# Patient Record
Sex: FEMALE | Race: WHITE | NOT HISPANIC OR LATINO | Employment: OTHER | URBAN - METROPOLITAN AREA
[De-identification: names, ages, dates, MRNs, and addresses within clinical notes are randomized per-mention and may not be internally consistent; named-entity substitution may affect disease eponyms.]

---

## 2017-02-09 ENCOUNTER — OFFICE VISIT (OUTPATIENT)
Dept: FAMILY MEDICINE CLINIC | Facility: CLINIC | Age: 82
End: 2017-02-09

## 2017-02-09 VITALS
BODY MASS INDEX: 22.36 KG/M2 | DIASTOLIC BLOOD PRESSURE: 70 MMHG | SYSTOLIC BLOOD PRESSURE: 120 MMHG | WEIGHT: 131 LBS | OXYGEN SATURATION: 98 % | HEIGHT: 64 IN | RESPIRATION RATE: 16 BRPM | HEART RATE: 66 BPM | TEMPERATURE: 96.8 F

## 2017-02-09 DIAGNOSIS — E55.9 VITAMIN D DEFICIENCY DISEASE: ICD-10-CM

## 2017-02-09 DIAGNOSIS — E78.2 MIXED HYPERLIPIDEMIA: ICD-10-CM

## 2017-02-09 DIAGNOSIS — M85.80 OSTEOPENIA: ICD-10-CM

## 2017-02-09 DIAGNOSIS — E03.9 HYPOTHYROIDISM, UNSPECIFIED TYPE: ICD-10-CM

## 2017-02-09 DIAGNOSIS — I10 ESSENTIAL HYPERTENSION: Primary | ICD-10-CM

## 2017-02-09 PROCEDURE — 99214 OFFICE O/P EST MOD 30 MIN: CPT | Performed by: FAMILY MEDICINE

## 2017-02-09 RX ORDER — ATORVASTATIN CALCIUM 10 MG/1
10 TABLET, FILM COATED ORAL DAILY
Qty: 90 TABLET | Refills: 1 | Status: SHIPPED | OUTPATIENT
Start: 2017-02-09 | End: 2017-08-09

## 2017-02-09 RX ORDER — AMLODIPINE BESYLATE 5 MG/1
5 TABLET ORAL DAILY
Qty: 90 TABLET | Refills: 1 | Status: SHIPPED | OUTPATIENT
Start: 2017-02-09 | End: 2017-08-09 | Stop reason: SDUPTHER

## 2017-02-09 RX ORDER — EZETIMIBE 10 MG/1
10 TABLET ORAL DAILY
Qty: 90 TABLET | Refills: 1 | Status: SHIPPED | OUTPATIENT
Start: 2017-02-09 | End: 2017-08-09

## 2017-02-09 RX ORDER — LEVOTHYROXINE SODIUM 0.05 MG/1
50 TABLET ORAL DAILY
Qty: 90 TABLET | Refills: 1 | Status: SHIPPED | OUTPATIENT
Start: 2017-02-09 | End: 2017-08-09 | Stop reason: SDUPTHER

## 2017-02-09 NOTE — PROGRESS NOTES
"Subjective   Blanca Weldon is a 89 y.o. female.     History of Present Illness   Chief Complaint:   Chief Complaint   Patient presents with   • Hyperlipidemia   • Hypertension   • Hypothyroidism       Blanca Weldon 89 y.o. female who presents today for Medical Management of the below listed issues and medication refills. I reviewed her lab results.   she has a history of   Patient Active Problem List   Diagnosis   • Essential hypertension   • Hyperlipidemia   • Osteopenia   • Hypothyroidism   .  Since the last visit, she has overall felt well.  she has been compliant with   Current Outpatient Prescriptions:   •  alendronate (FOSAMAX) 70 MG tablet, Take 1 tablet by mouth every 7 days., Disp: 12 tablet, Rfl: 3  •  amLODIPine (NORVASC) 5 MG tablet, Take 1 tablet by mouth daily., Disp: 90 tablet, Rfl: 1  •  atorvastatin (LIPITOR) 10 MG tablet, Take 1 tablet by mouth daily., Disp: 90 tablet, Rfl: 1  •  levothyroxine (SYNTHROID, LEVOTHROID) 50 MCG tablet, Take 1 tablet by mouth daily., Disp: 90 tablet, Rfl: 1  •  Multiple Vitamins-Minerals (CENTRUM SILVER PO), Take by mouth., Disp: , Rfl:   •  ZETIA 10 MG tablet, Take 1 tablet by mouth daily., Disp: 90 tablet, Rfl: 1.  she denies medication side effects.    All of the chronic condition(s) listed above are stable w/o issues.    Visit Vitals   • /69   • Pulse 66   • Temp 96.8 °F (36 °C) (Oral)   • Resp 16   • Ht 64\" (162.6 cm)   • Wt 131 lb (59.4 kg)   • SpO2 98%   • BMI 22.49 kg/m2       Results for orders placed or performed in visit on 10/10/16   Lipid panel   Result Value Ref Range    Total Cholesterol 146 0 - 200 mg/dL    Triglycerides 130 0 - 150 mg/dL    HDL Cholesterol 55 40 - 60 mg/dL    VLDL Cholesterol 26 5 - 40 mg/dL    LDL Cholesterol  65 0 - 100 mg/dL   Hepatic function panel   Result Value Ref Range    Total Protein 6.7 6.0 - 8.5 g/dL    Albumin 4.40 3.50 - 5.20 g/dL    Total Bilirubin 0.4 0.1 - 1.2 mg/dL    Bilirubin, Direct <0.2 0.0 - 0.3 mg/dL    " Alkaline Phosphatase 65 39 - 117 U/L    AST (SGOT) 28 1 - 32 U/L    ALT (SGPT) 25 1 - 33 U/L         The following portions of the patient's history were reviewed and updated as appropriate: allergies, current medications, past family history, past medical history, past social history, past surgical history and problem list.    Review of Systems   Constitutional: Negative for activity change, appetite change and unexpected weight change.   Eyes: Negative for visual disturbance.   Respiratory: Negative for chest tightness and shortness of breath.    Cardiovascular: Negative for chest pain and palpitations.   Skin: Negative for color change.   Neurological: Negative for syncope and speech difficulty.   Psychiatric/Behavioral: Negative for confusion and decreased concentration.       Objective   Physical Exam   Constitutional: She is oriented to person, place, and time. She appears well-developed.   HENT:   Head: Normocephalic and atraumatic.   Eyes: Pupils are equal, round, and reactive to light.   Neck: Normal range of motion.   Cardiovascular: Normal rate and regular rhythm.    Pulmonary/Chest: Effort normal and breath sounds normal.   Abdominal: Soft. Bowel sounds are normal.   Musculoskeletal: Normal range of motion.   Neurological: She is alert and oriented to person, place, and time.   Skin: Skin is warm and dry.   Psychiatric: She has a normal mood and affect. Her behavior is normal.   Nursing note and vitals reviewed.      Assessment/Plan   Blanca was seen today for hyperlipidemia, hypertension and hypothyroidism.    Diagnoses and all orders for this visit:    Essential hypertension  -     amLODIPine (NORVASC) 5 MG tablet; Take 1 tablet by mouth Daily.  -     Comprehensive metabolic panel; Future  -     Lipid panel; Future  -     CBC and Differential; Future  -     TSH; Future  -     Vitamin D 25 Hydroxy; Future    Mixed hyperlipidemia  -     atorvastatin (LIPITOR) 10 MG tablet; Take 1 tablet by mouth Daily.  -      Comprehensive metabolic panel; Future  -     Lipid panel; Future  -     CBC and Differential; Future  -     TSH; Future  -     Vitamin D 25 Hydroxy; Future    Osteopenia  -     ZETIA 10 MG tablet; Take 1 tablet by mouth Daily.  -     Comprehensive metabolic panel; Future  -     Lipid panel; Future  -     CBC and Differential; Future  -     TSH; Future  -     Vitamin D 25 Hydroxy; Future    Hypothyroidism, unspecified type  -     levothyroxine (SYNTHROID, LEVOTHROID) 50 MCG tablet; Take 1 tablet by mouth Daily.  -     Comprehensive metabolic panel; Future  -     Lipid panel; Future  -     CBC and Differential; Future  -     TSH; Future  -     Vitamin D 25 Hydroxy; Future    Vitamin D deficiency disease  -     Comprehensive metabolic panel; Future  -     Lipid panel; Future  -     CBC and Differential; Future  -     TSH; Future  -     Vitamin D 25 Hydroxy; Future

## 2017-04-04 DIAGNOSIS — M85.80 OSTEOPENIA: ICD-10-CM

## 2017-04-04 RX ORDER — EZETIMIBE 10 MG/1
TABLET ORAL
Qty: 90 TABLET | Refills: 1 | OUTPATIENT
Start: 2017-04-04

## 2017-04-11 ENCOUNTER — OFFICE VISIT (OUTPATIENT)
Dept: FAMILY MEDICINE CLINIC | Facility: CLINIC | Age: 82
End: 2017-04-11

## 2017-04-11 VITALS
TEMPERATURE: 97.2 F | OXYGEN SATURATION: 96 % | DIASTOLIC BLOOD PRESSURE: 60 MMHG | SYSTOLIC BLOOD PRESSURE: 140 MMHG | BODY MASS INDEX: 22.88 KG/M2 | RESPIRATION RATE: 16 BRPM | WEIGHT: 134 LBS | HEART RATE: 62 BPM | HEIGHT: 64 IN

## 2017-04-11 DIAGNOSIS — R11.0 NAUSEA: Primary | ICD-10-CM

## 2017-04-11 PROCEDURE — 99214 OFFICE O/P EST MOD 30 MIN: CPT | Performed by: FAMILY MEDICINE

## 2017-04-11 NOTE — PATIENT INSTRUCTIONS
Exercise 30 minutes most days of the week  Sleep 6-8 hours each night if possible  Low fat, low cholesterol diet   we discussed prescribed medications and how to take them   make sure you get results of any labs/studies ordered today  Low glycemic index diet     Stop fosomax, stop vitamins.  Take prilosec 20mg daily    Get labs done

## 2017-04-11 NOTE — PROGRESS NOTES
"Subjective   Blanca Weldon is a 89 y.o. female.     History of Present Illness   Chief Complaint:   Chief Complaint   Patient presents with   • Nausea       Blanca Weldon 89 y.o. female who presents today for nausea in the morning that has been present once a month for a year but it has worsened over the last month. She has awoke with nausea for the past three days. She denies vomiting.     she has a history of   Patient Active Problem List   Diagnosis   • Essential hypertension   • Hyperlipidemia   • Osteopenia   • Hypothyroidism   .  Since the last visit, she has overall felt well.  she has been compliant with   Current Outpatient Prescriptions:   •  alendronate (FOSAMAX) 70 MG tablet, Take 1 tablet by mouth every 7 days., Disp: 12 tablet, Rfl: 3  •  amLODIPine (NORVASC) 5 MG tablet, Take 1 tablet by mouth Daily., Disp: 90 tablet, Rfl: 1  •  atorvastatin (LIPITOR) 10 MG tablet, Take 1 tablet by mouth Daily., Disp: 90 tablet, Rfl: 1  •  levothyroxine (SYNTHROID, LEVOTHROID) 50 MCG tablet, Take 1 tablet by mouth Daily., Disp: 90 tablet, Rfl: 1  •  Multiple Vitamins-Minerals (CENTRUM SILVER PO), Take by mouth., Disp: , Rfl:   •  ZETIA 10 MG tablet, Take 1 tablet by mouth Daily., Disp: 90 tablet, Rfl: 1.  she denies medication side effects.    All of the chronic condition(s) listed above are stable w/o issues.    /60  Pulse 62  Temp 97.2 °F (36.2 °C) (Oral)   Resp 16  Ht 64\" (162.6 cm)  Wt 134 lb (60.8 kg)  SpO2 96%  BMI 23 kg/m2     The following portions of the patient's history were reviewed and updated as appropriate: allergies, current medications, past family history, past medical history, past social history, past surgical history and problem list.    Review of Systems   Constitutional: Negative for activity change, appetite change and unexpected weight change.   Eyes: Negative for visual disturbance.   Respiratory: Negative for chest tightness and shortness of breath.    Cardiovascular: Negative for " chest pain and palpitations.   Gastrointestinal: Positive for nausea.   Skin: Negative for color change.   Neurological: Negative for syncope and speech difficulty.   Psychiatric/Behavioral: Negative for confusion and decreased concentration.       Objective   Physical Exam   Constitutional: She is oriented to person, place, and time. She appears well-developed and well-nourished.   HENT:   Head: Normocephalic.   Nose: Nose normal.   Mouth/Throat: Oropharynx is clear and moist.   Eyes: Pupils are equal, round, and reactive to light.   Neck: Normal range of motion. Neck supple.   Cardiovascular: Normal rate, regular rhythm, normal heart sounds and intact distal pulses.    Pulmonary/Chest: Effort normal and breath sounds normal.   Abdominal: Soft. Bowel sounds are normal. She exhibits no distension and no mass. There is no tenderness. There is no guarding.   Musculoskeletal: Normal range of motion.   Neurological: She is alert and oriented to person, place, and time.   Skin: Skin is warm and dry.   Psychiatric: She has a normal mood and affect. Her behavior is normal.   Nursing note and vitals reviewed.      Assessment/Plan   Blanca was seen today for nausea.    Diagnoses and all orders for this visit:    Nausea

## 2017-05-09 ENCOUNTER — RESULTS ENCOUNTER (OUTPATIENT)
Dept: FAMILY MEDICINE CLINIC | Facility: CLINIC | Age: 82
End: 2017-05-09

## 2017-05-09 DIAGNOSIS — I10 ESSENTIAL HYPERTENSION: ICD-10-CM

## 2017-05-09 DIAGNOSIS — E78.2 MIXED HYPERLIPIDEMIA: ICD-10-CM

## 2017-05-09 DIAGNOSIS — E03.9 HYPOTHYROIDISM, UNSPECIFIED TYPE: ICD-10-CM

## 2017-05-09 DIAGNOSIS — E55.9 VITAMIN D DEFICIENCY DISEASE: ICD-10-CM

## 2017-05-09 DIAGNOSIS — M85.80 OSTEOPENIA: ICD-10-CM

## 2017-08-01 LAB
25(OH)D3+25(OH)D2 SERPL-MCNC: 38.4 NG/ML (ref 30–100)
ALBUMIN SERPL-MCNC: 4.1 G/DL (ref 3.5–5.2)
ALBUMIN/GLOB SERPL: 1.7 G/DL
ALP SERPL-CCNC: 74 U/L (ref 39–117)
ALT SERPL-CCNC: 19 U/L (ref 1–33)
AST SERPL-CCNC: 20 U/L (ref 1–32)
BASOPHILS # BLD AUTO: 0.04 10*3/MM3 (ref 0–0.2)
BASOPHILS NFR BLD AUTO: 0.5 % (ref 0–1.5)
BILIRUB SERPL-MCNC: 0.4 MG/DL (ref 0.1–1.2)
BUN SERPL-MCNC: 15 MG/DL (ref 8–23)
BUN/CREAT SERPL: 24.6 (ref 7–25)
CALCIUM SERPL-MCNC: 9.9 MG/DL (ref 8.6–10.5)
CHLORIDE SERPL-SCNC: 109 MMOL/L (ref 98–107)
CHOLEST SERPL-MCNC: 149 MG/DL (ref 0–200)
CO2 SERPL-SCNC: 26.3 MMOL/L (ref 22–29)
CREAT SERPL-MCNC: 0.61 MG/DL (ref 0.57–1)
EOSINOPHIL # BLD AUTO: 0.09 10*3/MM3 (ref 0–0.7)
EOSINOPHIL NFR BLD AUTO: 1.1 % (ref 0.3–6.2)
ERYTHROCYTE [DISTWIDTH] IN BLOOD BY AUTOMATED COUNT: 14.6 % (ref 11.7–13)
GLOBULIN SER CALC-MCNC: 2.4 GM/DL
GLUCOSE SERPL-MCNC: 100 MG/DL (ref 65–99)
HCT VFR BLD AUTO: 42.5 % (ref 35.6–45.5)
HDLC SERPL-MCNC: 62 MG/DL (ref 40–60)
HGB BLD-MCNC: 13.7 G/DL (ref 11.9–15.5)
IMM GRANULOCYTES # BLD: 0.03 10*3/MM3 (ref 0–0.03)
IMM GRANULOCYTES NFR BLD: 0.4 % (ref 0–0.5)
LDLC SERPL CALC-MCNC: 69 MG/DL (ref 0–100)
LYMPHOCYTES # BLD AUTO: 2.13 10*3/MM3 (ref 0.9–4.8)
LYMPHOCYTES NFR BLD AUTO: 25 % (ref 19.6–45.3)
MCH RBC QN AUTO: 33.7 PG (ref 26.9–32)
MCHC RBC AUTO-ENTMCNC: 32.2 G/DL (ref 32.4–36.3)
MCV RBC AUTO: 104.7 FL (ref 80.5–98.2)
MONOCYTES # BLD AUTO: 0.69 10*3/MM3 (ref 0.2–1.2)
MONOCYTES NFR BLD AUTO: 8.1 % (ref 5–12)
NEUTROPHILS # BLD AUTO: 5.55 10*3/MM3 (ref 1.9–8.1)
NEUTROPHILS NFR BLD AUTO: 64.9 % (ref 42.7–76)
PLATELET # BLD AUTO: 279 10*3/MM3 (ref 140–500)
POTASSIUM SERPL-SCNC: 3.9 MMOL/L (ref 3.5–5.2)
PROT SERPL-MCNC: 6.5 G/DL (ref 6–8.5)
RBC # BLD AUTO: 4.06 10*6/MM3 (ref 3.9–5.2)
SODIUM SERPL-SCNC: 148 MMOL/L (ref 136–145)
TRIGL SERPL-MCNC: 89 MG/DL (ref 0–150)
TSH SERPL DL<=0.005 MIU/L-ACNC: 1.19 MIU/ML (ref 0.27–4.2)
VLDLC SERPL CALC-MCNC: 17.8 MG/DL (ref 5–40)
WBC # BLD AUTO: 8.53 10*3/MM3 (ref 4.5–10.7)

## 2017-08-09 ENCOUNTER — OFFICE VISIT (OUTPATIENT)
Dept: FAMILY MEDICINE CLINIC | Facility: CLINIC | Age: 82
End: 2017-08-09

## 2017-08-09 VITALS
TEMPERATURE: 97.4 F | RESPIRATION RATE: 16 BRPM | DIASTOLIC BLOOD PRESSURE: 68 MMHG | HEIGHT: 64 IN | WEIGHT: 130 LBS | BODY MASS INDEX: 22.2 KG/M2 | HEART RATE: 86 BPM | SYSTOLIC BLOOD PRESSURE: 118 MMHG | OXYGEN SATURATION: 97 %

## 2017-08-09 DIAGNOSIS — I10 ESSENTIAL HYPERTENSION: Primary | ICD-10-CM

## 2017-08-09 DIAGNOSIS — E03.9 HYPOTHYROIDISM, UNSPECIFIED TYPE: ICD-10-CM

## 2017-08-09 DIAGNOSIS — M85.80 OSTEOPENIA: ICD-10-CM

## 2017-08-09 DIAGNOSIS — E78.2 MIXED HYPERLIPIDEMIA: ICD-10-CM

## 2017-08-09 PROCEDURE — 99214 OFFICE O/P EST MOD 30 MIN: CPT | Performed by: FAMILY MEDICINE

## 2017-08-09 RX ORDER — AMLODIPINE BESYLATE 5 MG/1
5 TABLET ORAL DAILY
Qty: 90 TABLET | Refills: 1 | Status: SHIPPED | OUTPATIENT
Start: 2017-08-09 | End: 2018-02-09 | Stop reason: SDUPTHER

## 2017-08-09 RX ORDER — ATORVASTATIN CALCIUM 10 MG/1
10 TABLET, FILM COATED ORAL DAILY
Qty: 90 TABLET | Refills: 1 | Status: CANCELLED | OUTPATIENT
Start: 2017-08-09

## 2017-08-09 RX ORDER — ALENDRONATE SODIUM 70 MG/1
70 TABLET ORAL
Qty: 12 TABLET | Refills: 3 | Status: CANCELLED | OUTPATIENT
Start: 2017-08-09

## 2017-08-09 RX ORDER — ASPIRIN 81 MG/1
81 TABLET ORAL DAILY
COMMUNITY
End: 2019-02-13 | Stop reason: ALTCHOICE

## 2017-08-09 RX ORDER — ATORVASTATIN CALCIUM 10 MG/1
10 TABLET, FILM COATED ORAL DAILY
COMMUNITY
End: 2017-08-09 | Stop reason: SDUPTHER

## 2017-08-09 RX ORDER — LEVOTHYROXINE SODIUM 0.05 MG/1
50 TABLET ORAL DAILY
Qty: 90 TABLET | Refills: 1 | Status: SHIPPED | OUTPATIENT
Start: 2017-08-09 | End: 2018-02-09 | Stop reason: SDUPTHER

## 2017-08-09 RX ORDER — ATORVASTATIN CALCIUM 10 MG/1
10 TABLET, FILM COATED ORAL DAILY
Qty: 90 TABLET | Refills: 1 | Status: SHIPPED | OUTPATIENT
Start: 2017-08-09 | End: 2018-02-09 | Stop reason: SDUPTHER

## 2017-08-09 RX ORDER — EZETIMIBE 10 MG/1
10 TABLET ORAL DAILY
Qty: 90 TABLET | Refills: 1 | Status: CANCELLED | OUTPATIENT
Start: 2017-08-09

## 2017-08-09 NOTE — PATIENT INSTRUCTIONS
Exercise 30 minutes most days of the week  Sleep 6-8 hours each night if possible  Low fat, low cholesterol diet   we discussed prescribed medications and how to take them   make sure you get results of any labs/studies ordered today  Low glycemic index diet     Get walker

## 2017-08-09 NOTE — PROGRESS NOTES
"Subjective   Blanca Weldon is a 89 y.o. female.     History of Present Illness   Chief Complaint:   Chief Complaint   Patient presents with   • Hyperlipidemia   • Hypertension   • Hypothyroidism   • Osteopenia       Blanca Weldon 89 y.o. female who presents today for Medical Management of the below listed issues and medication refills. I reviewed her lab results. Off fosomax   Do bone densometer 1 year  Labs good.  she has a history of   Patient Active Problem List   Diagnosis   • Essential hypertension   • Hyperlipidemia   • Osteopenia   • Hypothyroidism   .  Since the last visit, she has overall felt well.  she has been compliant with   Current Outpatient Prescriptions:   •  alendronate (FOSAMAX) 70 MG tablet, Take 1 tablet by mouth every 7 days., Disp: 12 tablet, Rfl: 3  •  amLODIPine (NORVASC) 5 MG tablet, Take 1 tablet by mouth Daily., Disp: 90 tablet, Rfl: 1  •  atorvastatin (LIPITOR) 10 MG tablet, Take 1 tablet by mouth Daily., Disp: 90 tablet, Rfl: 1  •  levothyroxine (SYNTHROID, LEVOTHROID) 50 MCG tablet, Take 1 tablet by mouth Daily., Disp: 90 tablet, Rfl: 1  •  Multiple Vitamins-Minerals (CENTRUM SILVER PO), Take by mouth., Disp: , Rfl:   •  ZETIA 10 MG tablet, Take 1 tablet by mouth Daily., Disp: 90 tablet, Rfl: 1.  she denies medication side effects.    All of the chronic condition(s) listed above are stable w/o issues.    /68  Pulse 86  Temp 97.4 °F (36.3 °C) (Oral)   Resp 16  Ht 64\" (162.6 cm)  Wt 130 lb (59 kg)  SpO2 97%  BMI 22.31 kg/m2    Results for orders placed or performed in visit on 05/09/17   Comprehensive metabolic panel   Result Value Ref Range    Glucose 100 (H) 65 - 99 mg/dL    BUN 15 8 - 23 mg/dL    Creatinine 0.61 0.57 - 1.00 mg/dL    eGFR Non African Am 92 >60 mL/min/1.73    eGFR African Am 112 >60 mL/min/1.73    BUN/Creatinine Ratio 24.6 7.0 - 25.0    Sodium 148 (H) 136 - 145 mmol/L    Potassium 3.9 3.5 - 5.2 mmol/L    Chloride 109 (H) 98 - 107 mmol/L    Total CO2 26.3 " 22.0 - 29.0 mmol/L    Calcium 9.9 8.6 - 10.5 mg/dL    Total Protein 6.5 6.0 - 8.5 g/dL    Albumin 4.10 3.50 - 5.20 g/dL    Globulin 2.4 gm/dL    A/G Ratio 1.7 g/dL    Total Bilirubin 0.4 0.1 - 1.2 mg/dL    Alkaline Phosphatase 74 39 - 117 U/L    AST (SGOT) 20 1 - 32 U/L    ALT (SGPT) 19 1 - 33 U/L   Lipid panel   Result Value Ref Range    Total Cholesterol 149 0 - 200 mg/dL    Triglycerides 89 0 - 150 mg/dL    HDL Cholesterol 62 (H) 40 - 60 mg/dL    VLDL Cholesterol 17.8 5 - 40 mg/dL    LDL Cholesterol  69 0 - 100 mg/dL   TSH   Result Value Ref Range    TSH 1.190 0.270 - 4.200 mIU/mL   Vitamin D 25 Hydroxy   Result Value Ref Range    25 Hydroxy, Vitamin D 38.4 30.0 - 100.0 ng/mL   CBC and Differential   Result Value Ref Range    WBC 8.53 4.50 - 10.70 10*3/mm3    RBC 4.06 3.90 - 5.20 10*6/mm3    Hemoglobin 13.7 11.9 - 15.5 g/dL    Hematocrit 42.5 35.6 - 45.5 %    .7 (H) 80.5 - 98.2 fL    MCH 33.7 (H) 26.9 - 32.0 pg    MCHC 32.2 (L) 32.4 - 36.3 g/dL    RDW 14.6 (H) 11.7 - 13.0 %    Platelets 279 140 - 500 10*3/mm3    Neutrophil Rel % 64.9 42.7 - 76.0 %    Lymphocyte Rel % 25.0 19.6 - 45.3 %    Monocyte Rel % 8.1 5.0 - 12.0 %    Eosinophil Rel % 1.1 0.3 - 6.2 %    Basophil Rel % 0.5 0.0 - 1.5 %    Neutrophils Absolute 5.55 1.90 - 8.10 10*3/mm3    Lymphocytes Absolute 2.13 0.90 - 4.80 10*3/mm3    Monocytes Absolute 0.69 0.20 - 1.20 10*3/mm3    Eosinophils Absolute 0.09 0.00 - 0.70 10*3/mm3    Basophils Absolute 0.04 0.00 - 0.20 10*3/mm3    Immature Granulocyte Rel % 0.4 0.0 - 0.5 %    Immature Grans Absolute 0.03 0.00 - 0.03 10*3/mm3         The following portions of the patient's history were reviewed and updated as appropriate: allergies, current medications, past family history, past medical history, past social history, past surgical history and problem list.    Review of Systems   Constitutional: Negative for activity change, appetite change and unexpected weight change.   Eyes: Negative for visual  disturbance.   Respiratory: Negative for chest tightness and shortness of breath.    Cardiovascular: Negative for chest pain and palpitations.   Skin: Negative for color change.   Neurological: Negative for syncope and speech difficulty.   Psychiatric/Behavioral: Negative for confusion and decreased concentration.       Objective   Physical Exam   Constitutional: She is oriented to person, place, and time. She appears well-developed and well-nourished.   HENT:   Head: Normocephalic and atraumatic.   Right Ear: External ear normal.   Left Ear: External ear normal.   Mouth/Throat: Oropharynx is clear and moist.   Eyes: EOM are normal. Pupils are equal, round, and reactive to light.   Neck: Normal range of motion. Neck supple. No thyromegaly present.   Cardiovascular: Normal rate and regular rhythm.    Pulmonary/Chest: Effort normal and breath sounds normal.   Abdominal: Soft. Bowel sounds are normal. There is no tenderness.   Musculoskeletal: Normal range of motion.   Lymphadenopathy:     She has no cervical adenopathy.   Neurological: She is alert and oriented to person, place, and time. She has normal reflexes.   Skin: No rash noted.   Psychiatric: She has a normal mood and affect. Her behavior is normal.   Nursing note and vitals reviewed.      Assessment/Plan   Blanca was seen today for hyperlipidemia, hypertension, hypothyroidism and osteopenia.    Diagnoses and all orders for this visit:    Essential hypertension  -     amLODIPine (NORVASC) 5 MG tablet; Take 1 tablet by mouth Daily.    Osteopenia    Mixed hyperlipidemia  -     atorvastatin (LIPITOR) 10 MG tablet; Take 1 tablet by mouth Daily.    Hypothyroidism, unspecified type  -     levothyroxine (SYNTHROID, LEVOTHROID) 50 MCG tablet; Take 1 tablet by mouth Daily.    Other orders  -     Cancel: alendronate (FOSAMAX) 70 MG tablet; Take 1 tablet by mouth Every 7 (Seven) Days.  -     Cancel: atorvastatin (LIPITOR) 10 MG tablet; Take 1 tablet by mouth Daily.  -      Cancel: ZETIA 10 MG tablet; Take 1 tablet by mouth Daily.

## 2017-09-18 ENCOUNTER — CLINICAL SUPPORT (OUTPATIENT)
Dept: FAMILY MEDICINE CLINIC | Facility: CLINIC | Age: 82
End: 2017-09-18

## 2017-09-18 DIAGNOSIS — Z23 IMMUNIZATION DUE: Primary | ICD-10-CM

## 2017-09-18 PROCEDURE — G0008 ADMIN INFLUENZA VIRUS VAC: HCPCS | Performed by: FAMILY MEDICINE

## 2017-09-18 PROCEDURE — 90686 IIV4 VACC NO PRSV 0.5 ML IM: CPT | Performed by: FAMILY MEDICINE

## 2017-12-15 ENCOUNTER — OFFICE VISIT (OUTPATIENT)
Dept: RETAIL CLINIC | Facility: CLINIC | Age: 82
End: 2017-12-15

## 2017-12-15 VITALS
OXYGEN SATURATION: 98 % | TEMPERATURE: 98.1 F | RESPIRATION RATE: 18 BRPM | HEART RATE: 77 BPM | SYSTOLIC BLOOD PRESSURE: 136 MMHG | DIASTOLIC BLOOD PRESSURE: 60 MMHG

## 2017-12-15 DIAGNOSIS — J18.9 PNEUMONIA OF RIGHT LOWER LOBE DUE TO INFECTIOUS ORGANISM: Primary | ICD-10-CM

## 2017-12-15 PROCEDURE — 99213 OFFICE O/P EST LOW 20 MIN: CPT | Performed by: NURSE PRACTITIONER

## 2017-12-15 RX ORDER — AZITHROMYCIN 250 MG/1
TABLET, FILM COATED ORAL
Qty: 6 TABLET | Refills: 0 | Status: SHIPPED | OUTPATIENT
Start: 2017-12-15 | End: 2018-02-09

## 2017-12-15 RX ORDER — CETIRIZINE HYDROCHLORIDE 10 MG/1
10 TABLET ORAL DAILY
COMMUNITY

## 2017-12-15 NOTE — PROGRESS NOTES
Subjective   Blanca Weldon is a 89 y.o. female.     Cough   This is a new problem. Episode onset: 2 days ago. The problem has been gradually worsening. The problem occurs every few minutes. The cough is productive of sputum. Associated symptoms include nasal congestion, postnasal drip and rhinorrhea. Pertinent negatives include no chest pain, chills, ear congestion, ear pain, eye redness, fever, headaches, heartburn, hemoptysis, myalgias, rash, sore throat, shortness of breath, sweats or wheezing. The symptoms are aggravated by lying down and exercise. Treatments tried: robitussin, zyrtec x 3 days. The treatment provided no relief. Her past medical history is significant for environmental allergies. There is no history of asthma, bronchitis, COPD or pneumonia.       The following portions of the patient's history were reviewed and updated as appropriate: allergies, current medications, past family history, past medical history, past social history, past surgical history and problem list.    Review of Systems   Constitutional: Positive for appetite change (diminished) and fatigue (extreme). Negative for chills, diaphoresis and fever.   HENT: Positive for congestion, postnasal drip and rhinorrhea. Negative for ear discharge, ear pain, facial swelling, hearing loss, mouth sores, nosebleeds, sinus pain, sinus pressure, sneezing, sore throat, trouble swallowing and voice change.    Eyes: Negative for pain, discharge, redness and itching.   Respiratory: Positive for cough and chest tightness. Negative for hemoptysis, shortness of breath, wheezing and stridor.    Cardiovascular: Negative for chest pain and palpitations.   Gastrointestinal: Negative for abdominal pain, constipation, diarrhea, heartburn, nausea and vomiting.   Genitourinary: Negative for decreased urine volume.   Musculoskeletal: Negative for myalgias, neck pain and neck stiffness.   Skin: Negative for rash.   Allergic/Immunologic: Positive for environmental  allergies.   Neurological: Negative for dizziness, syncope, weakness and headaches.       Objective   Physical Exam   Constitutional: She is oriented to person, place, and time. She appears well-developed and well-nourished. She is cooperative.  Non-toxic appearance. She appears ill. No distress.   HENT:   Right Ear: Hearing, tympanic membrane, external ear and ear canal normal.   Left Ear: Hearing, tympanic membrane, external ear and ear canal normal.   Nose: Nose normal. Right sinus exhibits no maxillary sinus tenderness and no frontal sinus tenderness. Left sinus exhibits no maxillary sinus tenderness and no frontal sinus tenderness.   Mouth/Throat: Uvula is midline, oropharynx is clear and moist and mucous membranes are normal. No oral lesions. No oropharyngeal exudate, posterior oropharyngeal edema or posterior oropharyngeal erythema. Tonsils are 1+ on the right. Tonsils are 1+ on the left. No tonsillar exudate.   Eyes: Conjunctivae and lids are normal.   Cardiovascular: Normal rate, regular rhythm, S1 normal and S2 normal.    Murmur heard.  Pulmonary/Chest: Effort normal. She has decreased breath sounds in the right lower field. She has no wheezes. She has no rhonchi. She has rales in the right lower field.   Abdominal: Bowel sounds are normal. There is no tenderness.   Lymphadenopathy:     She has no cervical adenopathy.   Neurological: She is alert and oriented to person, place, and time.   Skin: Skin is warm and dry. She is not diaphoretic. No pallor.   Vitals reviewed.      Assessment/Plan   Blanca was seen today for cough.    Diagnoses and all orders for this visit:    Pneumonia of right lower lobe due to infectious organism  -     azithromycin (ZITHROMAX Z-JAYLEN) 250 MG tablet; Take 2 tablets the first day, then 1 tablet daily for 4 days.          -     Continue zyrtec, avoid cough suppressants         -     Follow up with PCP or urgent treatment for persistent or worsening symptoms

## 2017-12-15 NOTE — PATIENT INSTRUCTIONS
Community-Acquired Pneumonia, Adult  Pneumonia is an infection of the lungs. There are different types of pneumonia. One type can develop while a person is in a hospital. A different type, called community-acquired pneumonia, develops in people who are not, or have not recently been, in the hospital or other health care facility.   CAUSES  Pneumonia may be caused by bacteria, viruses, or funguses. Community-acquired pneumonia is often caused by Streptococcus pneumonia bacteria. These bacteria are often passed from one person to another by breathing in droplets from the cough or sneeze of an infected person.  RISK FACTORS  The condition is more likely to develop in:  · People who have chronic diseases, such as chronic obstructive pulmonary disease (COPD), asthma, congestive heart failure, cystic fibrosis, diabetes, or kidney disease.  · People who have early-stage or late-stage HIV.  · People who have sickle cell disease.  · People who have had their spleen removed (splenectomy).  · People who have poor dental hygiene.  · People who have medical conditions that increase the risk of breathing in (aspirating) secretions their own mouth and nose.    · People who have a weakened immune system (immunocompromised).  · People who smoke.  · People who travel to areas where pneumonia-causing germs commonly exist.  · People who are around animal habitats or animals that have pneumonia-causing germs, including birds, bats, rabbits, cats, and farm animals.  SYMPTOMS  Symptoms of this condition include:  · A dry cough.  · A wet (productive) cough.  · Fever.  · Sweating.  · Chest pain, especially when breathing deeply or coughing.  · Rapid breathing or difficulty breathing.  · Shortness of breath.  · Shaking chills.  · Fatigue.  · Muscle aches.  DIAGNOSIS  Your health care provider will take a medical history and perform a physical exam. You may also have other tests, including:  · Imaging studies of your chest, including  X-rays.  · Tests to check your blood oxygen level and other blood gases.  · Other tests on blood, mucus (sputum), fluid around your lungs (pleural fluid), and urine.  If your pneumonia is severe, other tests may be done to identify the specific cause of your illness.  TREATMENT  The type of treatment that you receive depends on many factors, such as the cause of your pneumonia, the medicines you take, and other medical conditions that you have. For most adults, treatment and recovery from pneumonia may occur at home. In some cases, treatment must happen in a hospital. Treatment may include:  · Antibiotic medicines, if the pneumonia was caused by bacteria.  · Antiviral medicines, if the pneumonia was caused by a virus.  · Medicines that are given by mouth or through an IV tube.  · Oxygen.  · Respiratory therapy.  Although rare, treating severe pneumonia may include:  · Mechanical ventilation. This is done if you are not breathing well on your own and you cannot maintain a safe blood oxygen level.  · Thoracentesis. This procedure removes fluid around one lung or both lungs to help you breathe better.  HOME CARE INSTRUCTIONS  · Take over-the-counter and prescription medicines only as told by your health care provider.    Only take cough medicine if you are losing sleep. Understand that cough medicine can prevent your body's natural ability to remove mucus from your lungs.    If you were prescribed an antibiotic medicine, take it as told by your health care provider. Do not stop taking the antibiotic even if you start to feel better.  · Sleep in a semi-upright position at night. Try sleeping in a reclining chair, or place a few pillows under your head.  · Do not use tobacco products, including cigarettes, chewing tobacco, and e-cigarettes. If you need help quitting, ask your health care provider.  · Drink enough water to keep your urine clear or pale yellow. This will help to thin out mucus secretions in your  lungs.  PREVENTION  There are ways that you can decrease your risk of developing community-acquired pneumonia. Consider getting a pneumococcal vaccine if:  · You are older than 65 years of age.  · You are older than 19 years of age and are undergoing cancer treatment, have chronic lung disease, or have other medical conditions that affect your immune system. Ask your health care provider if this applies to you.  There are different types and schedules of pneumococcal vaccines. Ask your health care provider which vaccination option is best for you.  You may also prevent community-acquired pneumonia if you take these actions:  · Get an influenza vaccine every year. Ask your health care provider which type of influenza vaccine is best for you.  · Go to the dentist on a regular basis.  · Wash your hands often. Use hand  if soap and water are not available.  SEEK MEDICAL CARE IF:  · You have a fever.  · You are losing sleep because you cannot control your cough with cough medicine.  SEEK IMMEDIATE MEDICAL CARE IF:  · You have worsening shortness of breath.  · You have increased chest pain.  · Your sickness becomes worse, especially if you are an older adult or have a weakened immune system.  · You cough up blood.     This information is not intended to replace advice given to you by your health care provider. Make sure you discuss any questions you have with your health care provider.     Document Released: 12/18/2006 Document Revised: 09/07/2016 Document Reviewed: 04/13/2016  [employer] Patient Education ©2017 [employer]

## 2018-02-09 ENCOUNTER — OFFICE VISIT (OUTPATIENT)
Dept: FAMILY MEDICINE CLINIC | Facility: CLINIC | Age: 83
End: 2018-02-09

## 2018-02-09 VITALS
HEIGHT: 64 IN | DIASTOLIC BLOOD PRESSURE: 57 MMHG | WEIGHT: 125 LBS | BODY MASS INDEX: 21.34 KG/M2 | SYSTOLIC BLOOD PRESSURE: 119 MMHG | HEART RATE: 68 BPM | RESPIRATION RATE: 16 BRPM | TEMPERATURE: 97 F | OXYGEN SATURATION: 98 %

## 2018-02-09 DIAGNOSIS — M25.512 ACUTE PAIN OF LEFT SHOULDER: Primary | ICD-10-CM

## 2018-02-09 DIAGNOSIS — I10 ESSENTIAL HYPERTENSION: ICD-10-CM

## 2018-02-09 DIAGNOSIS — E03.9 HYPOTHYROIDISM, UNSPECIFIED TYPE: ICD-10-CM

## 2018-02-09 DIAGNOSIS — E78.2 MIXED HYPERLIPIDEMIA: ICD-10-CM

## 2018-02-09 PROCEDURE — 99214 OFFICE O/P EST MOD 30 MIN: CPT | Performed by: FAMILY MEDICINE

## 2018-02-09 PROCEDURE — 73030 X-RAY EXAM OF SHOULDER: CPT | Performed by: FAMILY MEDICINE

## 2018-02-09 RX ORDER — AMLODIPINE BESYLATE 5 MG/1
5 TABLET ORAL DAILY
Qty: 90 TABLET | Refills: 1 | Status: SHIPPED | OUTPATIENT
Start: 2018-02-09 | End: 2018-09-26 | Stop reason: SDUPTHER

## 2018-02-09 RX ORDER — LEVOTHYROXINE SODIUM 0.05 MG/1
50 TABLET ORAL DAILY
Qty: 90 TABLET | Refills: 1 | Status: SHIPPED | OUTPATIENT
Start: 2018-02-09 | End: 2018-09-26 | Stop reason: SDUPTHER

## 2018-02-09 RX ORDER — ATORVASTATIN CALCIUM 10 MG/1
10 TABLET, FILM COATED ORAL DAILY
Qty: 90 TABLET | Refills: 1 | Status: SHIPPED | OUTPATIENT
Start: 2018-02-09 | End: 2018-09-26 | Stop reason: SDUPTHER

## 2018-02-09 NOTE — PROGRESS NOTES
"Subjective   Blanca Weldon is a 89 y.o. female.     History of Present Illness   Chief Complaint:   Chief Complaint   Patient presents with   • Hypertension   • Hyperlipidemia   • Hypothyroidism       Blanca Weldon 89 y.o. female who presents today for Medical Management of the below listed issues and medication refills. She complains of intermittent  left shoulder pain x1 month. She had a positive fall assessment.  She fell 2 times last 6 months. Discussed falls  She is careful and does not use walker,  Gait in office normal  House well I  Lighted and all throw rugs gone  Will x ray  Left shoulder.  she has a problem list of   Patient Active Problem List   Diagnosis   • Essential hypertension   • Hyperlipidemia   • Osteopenia   • Hypothyroidism   .  Since the last visit, she has overall felt well.  she has been compliant with   Current Outpatient Prescriptions:   •  amLODIPine (NORVASC) 5 MG tablet, Take 1 tablet by mouth Daily., Disp: 90 tablet, Rfl: 1  •  aspirin 81 MG EC tablet, Take 81 mg by mouth Daily., Disp: , Rfl:   •  atorvastatin (LIPITOR) 10 MG tablet, Take 1 tablet by mouth Daily., Disp: 90 tablet, Rfl: 1  •  cetirizine (zyrTEC) 10 MG tablet, Take 10 mg by mouth Daily., Disp: , Rfl:   •  levothyroxine (SYNTHROID, LEVOTHROID) 50 MCG tablet, Take 1 tablet by mouth Daily., Disp: 90 tablet, Rfl: 1  •  Multiple Vitamins-Minerals (ICAPS PO), Take  by mouth., Disp: , Rfl: .  she denies medication side effects.    All of the chronic condition(s) listed above are stable w/o issues.    /57  Pulse 68  Temp 97 °F (36.1 °C) (Oral)   Resp 16  Ht 162.6 cm (64\")  Wt 56.7 kg (125 lb)  SpO2 98%  BMI 21.46 kg/m2    The following portions of the patient's history were reviewed and updated as appropriate: allergies, current medications, past family history, past medical history, past social history, past surgical history and problem list.    Review of Systems   Constitutional: Negative for activity change, " appetite change, chills, fatigue, fever and unexpected weight change.   HENT: Negative for congestion, ear pain, hearing loss, nosebleeds, rhinorrhea and sore throat.    Eyes: Negative for pain, redness and visual disturbance.   Respiratory: Negative for cough, shortness of breath and wheezing.    Cardiovascular: Negative for chest pain, palpitations and leg swelling.   Gastrointestinal: Negative for abdominal pain, blood in stool, constipation, diarrhea, nausea and vomiting.   Endocrine: Negative for cold intolerance and heat intolerance.   Genitourinary: Negative for difficulty urinating, dysuria, frequency, hematuria, pelvic pain, urgency and vaginal discharge.   Musculoskeletal: Negative for arthralgias, back pain and joint swelling.        Left shoulder pain x1 month   Skin: Negative for rash and wound.   Neurological: Negative for dizziness, weakness, numbness and headaches.   Hematological: Does not bruise/bleed easily.   Psychiatric/Behavioral: Negative for dysphoric mood, sleep disturbance and suicidal ideas. The patient is not nervous/anxious.        Objective   Physical Exam   Constitutional: She is oriented to person, place, and time. She appears well-developed and well-nourished.   HENT:   Mouth/Throat: Oropharynx is clear and moist.   Eyes: Pupils are equal, round, and reactive to light.   Neck: Normal range of motion.   Cardiovascular: Normal rate and regular rhythm.    Pulmonary/Chest: Effort normal and breath sounds normal.   Abdominal: Soft. Bowel sounds are normal.   Musculoskeletal:   Pain on rom left shoulder   No sts or bruise.   Neurological: She is alert and oriented to person, place, and time.   Skin: Skin is warm and dry.   Psychiatric: She has a normal mood and affect. Her behavior is normal.   Nursing note and vitals reviewed.      Assessment/Plan   Blanca was seen today for hypertension, hyperlipidemia and hypothyroidism.    Diagnoses and all orders for this visit:    Acute pain of left  shoulder  -     XR Shoulder 2+ View Left  -     Comprehensive metabolic panel; Future  -     Lipid panel; Future  -     CBC and Differential; Future  -     TSH; Future    Essential hypertension  -     amLODIPine (NORVASC) 5 MG tablet; Take 1 tablet by mouth Daily.  -     Comprehensive metabolic panel; Future  -     Lipid panel; Future  -     CBC and Differential; Future  -     TSH; Future    Mixed hyperlipidemia  -     atorvastatin (LIPITOR) 10 MG tablet; Take 1 tablet by mouth Daily.  -     Comprehensive metabolic panel; Future  -     Lipid panel; Future  -     CBC and Differential; Future  -     TSH; Future    Hypothyroidism, unspecified type  -     levothyroxine (SYNTHROID, LEVOTHROID) 50 MCG tablet; Take 1 tablet by mouth Daily.  -     Comprehensive metabolic panel; Future  -     Lipid panel; Future  -     CBC and Differential; Future  -     TSH; Future        Views: lateral and posterior-anterior    Relevant Clinical Issues/Diagnoses/Indications for XRay: see HPI  Clinical Findings: Extremities: Shoulder: Negative    Compared with previous XRay? no    Date of Previous Xray:No previous xray available for comparison    Changes on current Xray? no

## 2018-02-28 DIAGNOSIS — E78.2 MIXED HYPERLIPIDEMIA: ICD-10-CM

## 2018-02-28 RX ORDER — ATORVASTATIN CALCIUM 10 MG/1
TABLET, FILM COATED ORAL
Qty: 90 TABLET | Refills: 1 | OUTPATIENT
Start: 2018-02-28

## 2018-07-09 ENCOUNTER — RESULTS ENCOUNTER (OUTPATIENT)
Dept: FAMILY MEDICINE CLINIC | Facility: CLINIC | Age: 83
End: 2018-07-09

## 2018-07-09 DIAGNOSIS — I10 ESSENTIAL HYPERTENSION: ICD-10-CM

## 2018-07-09 DIAGNOSIS — E03.9 HYPOTHYROIDISM, UNSPECIFIED TYPE: ICD-10-CM

## 2018-07-09 DIAGNOSIS — E78.2 MIXED HYPERLIPIDEMIA: ICD-10-CM

## 2018-07-09 DIAGNOSIS — M25.512 ACUTE PAIN OF LEFT SHOULDER: ICD-10-CM

## 2018-08-02 LAB
ALBUMIN SERPL-MCNC: 4.5 G/DL (ref 3.5–5.2)
ALBUMIN/GLOB SERPL: 2 G/DL
ALP SERPL-CCNC: 73 U/L (ref 39–117)
ALT SERPL-CCNC: 12 U/L (ref 1–33)
AST SERPL-CCNC: 14 U/L (ref 1–32)
BASOPHILS # BLD AUTO: 0.04 10*3/MM3 (ref 0–0.2)
BASOPHILS NFR BLD AUTO: 0.5 % (ref 0–1.5)
BILIRUB SERPL-MCNC: 0.4 MG/DL (ref 0.1–1.2)
BUN SERPL-MCNC: 19 MG/DL (ref 8–23)
BUN/CREAT SERPL: 24.4 (ref 7–25)
CALCIUM SERPL-MCNC: 9.3 MG/DL (ref 8.2–9.6)
CHLORIDE SERPL-SCNC: 104 MMOL/L (ref 98–107)
CHOLEST SERPL-MCNC: 157 MG/DL (ref 0–200)
CO2 SERPL-SCNC: 28.8 MMOL/L (ref 22–29)
CREAT SERPL-MCNC: 0.78 MG/DL (ref 0.57–1)
EOSINOPHIL # BLD AUTO: 0.15 10*3/MM3 (ref 0–0.7)
EOSINOPHIL NFR BLD AUTO: 2 % (ref 0.3–6.2)
ERYTHROCYTE [DISTWIDTH] IN BLOOD BY AUTOMATED COUNT: 13.9 % (ref 11.7–13)
GLOBULIN SER CALC-MCNC: 2.3 GM/DL
GLUCOSE SERPL-MCNC: 91 MG/DL (ref 65–99)
HCT VFR BLD AUTO: 45.1 % (ref 35.6–45.5)
HDLC SERPL-MCNC: 57 MG/DL (ref 40–60)
HGB BLD-MCNC: 14.6 G/DL (ref 11.9–15.5)
IMM GRANULOCYTES # BLD: 0.01 10*3/MM3 (ref 0–0.03)
IMM GRANULOCYTES NFR BLD: 0.1 % (ref 0–0.5)
LDLC SERPL CALC-MCNC: 71 MG/DL (ref 0–100)
LYMPHOCYTES # BLD AUTO: 2.7 10*3/MM3 (ref 0.9–4.8)
LYMPHOCYTES NFR BLD AUTO: 35.3 % (ref 19.6–45.3)
MCH RBC QN AUTO: 33.4 PG (ref 26.9–32)
MCHC RBC AUTO-ENTMCNC: 32.4 G/DL (ref 32.4–36.3)
MCV RBC AUTO: 103.2 FL (ref 80.5–98.2)
MONOCYTES # BLD AUTO: 0.53 10*3/MM3 (ref 0.2–1.2)
MONOCYTES NFR BLD AUTO: 6.9 % (ref 5–12)
NEUTROPHILS # BLD AUTO: 4.22 10*3/MM3 (ref 1.9–8.1)
NEUTROPHILS NFR BLD AUTO: 55.3 % (ref 42.7–76)
PLATELET # BLD AUTO: 266 10*3/MM3 (ref 140–500)
POTASSIUM SERPL-SCNC: 4.2 MMOL/L (ref 3.5–5.2)
PROT SERPL-MCNC: 6.8 G/DL (ref 6–8.5)
RBC # BLD AUTO: 4.37 10*6/MM3 (ref 3.9–5.2)
SODIUM SERPL-SCNC: 145 MMOL/L (ref 136–145)
TRIGL SERPL-MCNC: 143 MG/DL (ref 0–150)
TSH SERPL DL<=0.005 MIU/L-ACNC: 0.98 MIU/ML (ref 0.27–4.2)
VLDLC SERPL CALC-MCNC: 28.6 MG/DL (ref 5–40)
WBC # BLD AUTO: 7.64 10*3/MM3 (ref 4.5–10.7)

## 2018-08-04 DIAGNOSIS — E03.9 HYPOTHYROIDISM, UNSPECIFIED TYPE: ICD-10-CM

## 2018-08-04 DIAGNOSIS — E78.2 MIXED HYPERLIPIDEMIA: ICD-10-CM

## 2018-08-04 DIAGNOSIS — I10 ESSENTIAL HYPERTENSION: ICD-10-CM

## 2018-08-06 RX ORDER — ATORVASTATIN CALCIUM 10 MG/1
10 TABLET, FILM COATED ORAL DAILY
Qty: 90 TABLET | Refills: 1 | OUTPATIENT
Start: 2018-08-06

## 2018-08-06 RX ORDER — LEVOTHYROXINE SODIUM 0.05 MG/1
50 TABLET ORAL DAILY
Qty: 90 TABLET | Refills: 1 | OUTPATIENT
Start: 2018-08-06

## 2018-08-06 RX ORDER — AMLODIPINE BESYLATE 5 MG/1
5 TABLET ORAL DAILY
Qty: 90 TABLET | Refills: 1 | OUTPATIENT
Start: 2018-08-06

## 2018-08-08 ENCOUNTER — OFFICE VISIT (OUTPATIENT)
Dept: FAMILY MEDICINE CLINIC | Facility: CLINIC | Age: 83
End: 2018-08-08

## 2018-08-08 VITALS
OXYGEN SATURATION: 98 % | BODY MASS INDEX: 21.17 KG/M2 | HEART RATE: 56 BPM | SYSTOLIC BLOOD PRESSURE: 116 MMHG | HEIGHT: 64 IN | TEMPERATURE: 97.4 F | DIASTOLIC BLOOD PRESSURE: 60 MMHG | WEIGHT: 124 LBS

## 2018-08-08 DIAGNOSIS — M85.80 OSTEOPENIA, UNSPECIFIED LOCATION: ICD-10-CM

## 2018-08-08 DIAGNOSIS — I10 ESSENTIAL HYPERTENSION: Primary | ICD-10-CM

## 2018-08-08 DIAGNOSIS — R11.0 NAUSEA: ICD-10-CM

## 2018-08-08 DIAGNOSIS — E03.9 HYPOTHYROIDISM, UNSPECIFIED TYPE: ICD-10-CM

## 2018-08-08 PROCEDURE — 99214 OFFICE O/P EST MOD 30 MIN: CPT | Performed by: FAMILY MEDICINE

## 2018-08-08 NOTE — PATIENT INSTRUCTIONS
Exercise 30 minutes most days of the week  Sleep 6-8 hours each night if possible  Low fat, low cholesterol diet   we discussed prescribed medications and how to take them   make sure you get results of any labs/studies ordered today  Low glycemic index diet     Bone densometer set up

## 2018-08-08 NOTE — PROGRESS NOTES
"Subjective   Chief Complaint:   Chief Complaint   Patient presents with   • Follow-up     6 mon f/u    • Nausea     In the morning    • Hyperlipidemia   • Hypertension         History of Present Illness             Blanca Weldon 89 y.o. female who presents today for pt is being seen today for a six month follow up and experiencing nausea in the morning .   Nausea worse and  Last 2 hrs in am.  No abd pain. Eats food in am and nausea disappears. No major weight loss. No vomiting. Labs good. Would send to gi doctor for consult.  Family wants to watch.   she has a problem list of   Patient Active Problem List   Diagnosis   • Essential hypertension   • Hyperlipidemia   • Osteopenia   • Hypothyroidism   .  Since the last visit, she has overall felt well.  she has been compliant with   Current Outpatient Prescriptions:   •  amLODIPine (NORVASC) 5 MG tablet, Take 1 tablet by mouth Daily., Disp: 90 tablet, Rfl: 1  •  aspirin 81 MG EC tablet, Take 81 mg by mouth Daily., Disp: , Rfl:   •  atorvastatin (LIPITOR) 10 MG tablet, Take 1 tablet by mouth Daily., Disp: 90 tablet, Rfl: 1  •  cetirizine (zyrTEC) 10 MG tablet, Take 10 mg by mouth Daily., Disp: , Rfl:   •  levothyroxine (SYNTHROID, LEVOTHROID) 50 MCG tablet, Take 1 tablet by mouth Daily., Disp: 90 tablet, Rfl: 1.  she denies medication side effects.    All of the chronic condition(s) listed above are stable w/o issues.    /60   Pulse 56   Temp 97.4 °F (36.3 °C)   Ht 162.6 cm (64.02\")   Wt 56.2 kg (124 lb)   SpO2 98%   BMI 21.27 kg/m²     Results for orders placed or performed in visit on 07/09/18   Comprehensive metabolic panel   Result Value Ref Range    Glucose 91 65 - 99 mg/dL    BUN 19 8 - 23 mg/dL    Creatinine 0.78 0.57 - 1.00 mg/dL    eGFR Non African Am 69 >60 mL/min/1.73    eGFR African Am 84 >60 mL/min/1.73    BUN/Creatinine Ratio 24.4 7.0 - 25.0    Sodium 145 136 - 145 mmol/L    Potassium 4.2 3.5 - 5.2 mmol/L    Chloride 104 98 - 107 mmol/L    Total " CO2 28.8 22.0 - 29.0 mmol/L    Calcium 9.3 8.2 - 9.6 mg/dL    Total Protein 6.8 6.0 - 8.5 g/dL    Albumin 4.50 3.50 - 5.20 g/dL    Globulin 2.3 gm/dL    A/G Ratio 2.0 g/dL    Total Bilirubin 0.4 0.1 - 1.2 mg/dL    Alkaline Phosphatase 73 39 - 117 U/L    AST (SGOT) 14 1 - 32 U/L    ALT (SGPT) 12 1 - 33 U/L   Lipid panel   Result Value Ref Range    Total Cholesterol 157 0 - 200 mg/dL    Triglycerides 143 0 - 150 mg/dL    HDL Cholesterol 57 40 - 60 mg/dL    VLDL Cholesterol 28.6 5 - 40 mg/dL    LDL Cholesterol  71 0 - 100 mg/dL   TSH   Result Value Ref Range    TSH 0.980 0.270 - 4.200 mIU/mL   CBC and Differential   Result Value Ref Range    WBC 7.64 4.50 - 10.70 10*3/mm3    RBC 4.37 3.90 - 5.20 10*6/mm3    Hemoglobin 14.6 11.9 - 15.5 g/dL    Hematocrit 45.1 35.6 - 45.5 %    .2 (H) 80.5 - 98.2 fL    MCH 33.4 (H) 26.9 - 32.0 pg    MCHC 32.4 32.4 - 36.3 g/dL    RDW 13.9 (H) 11.7 - 13.0 %    Platelets 266 140 - 500 10*3/mm3    Neutrophil Rel % 55.3 42.7 - 76.0 %    Lymphocyte Rel % 35.3 19.6 - 45.3 %    Monocyte Rel % 6.9 5.0 - 12.0 %    Eosinophil Rel % 2.0 0.3 - 6.2 %    Basophil Rel % 0.5 0.0 - 1.5 %    Neutrophils Absolute 4.22 1.90 - 8.10 10*3/mm3    Lymphocytes Absolute 2.70 0.90 - 4.80 10*3/mm3    Monocytes Absolute 0.53 0.20 - 1.20 10*3/mm3    Eosinophils Absolute 0.15 0.00 - 0.70 10*3/mm3    Basophils Absolute 0.04 0.00 - 0.20 10*3/mm3    Immature Granulocyte Rel % 0.1 0.0 - 0.5 %    Immature Grans Absolute 0.01 0.00 - 0.03 10*3/mm3             The following portions of the patient's history were reviewed and updated as appropriate: allergies, current medications, past family history, past medical history, past social history, past surgical history and problem list.    Review of Systems   Constitutional: Positive for unexpected weight change.   HENT: Negative for congestion and sore throat.    Eyes: Negative for visual disturbance.   Respiratory: Negative for chest tightness.    Cardiovascular: Negative  for chest pain.   Gastrointestinal: Negative for abdominal pain, diarrhea and vomiting.   Genitourinary: Negative for difficulty urinating.   Musculoskeletal: Positive for back pain.   Neurological: Negative for dizziness and headaches.   Psychiatric/Behavioral: Positive for behavioral problems.       Objective   Physical Exam   Constitutional: She is oriented to person, place, and time. She appears well-developed and well-nourished.   HENT:   Head: Normocephalic.   Nose: Nose normal.   Mouth/Throat: No oropharyngeal exudate.   Eyes: Pupils are equal, round, and reactive to light.   Neck: Normal range of motion. No thyromegaly present.   Cardiovascular: Normal rate and regular rhythm.    Pulmonary/Chest: Effort normal and breath sounds normal.   Abdominal: Soft. She exhibits no distension.   Musculoskeletal: Normal range of motion. She exhibits no edema.   Neurological: She is alert and oriented to person, place, and time.   Skin: Skin is warm and dry.   Psychiatric: She has a normal mood and affect. Her behavior is normal.   Nursing note and vitals reviewed.      Assessment/Plan   There are no diagnoses linked to this encounter.

## 2018-09-17 DIAGNOSIS — E78.2 MIXED HYPERLIPIDEMIA: ICD-10-CM

## 2018-09-18 RX ORDER — ATORVASTATIN CALCIUM 10 MG/1
10 TABLET, FILM COATED ORAL DAILY
Qty: 90 TABLET | Refills: 1 | OUTPATIENT
Start: 2018-09-18

## 2018-09-26 DIAGNOSIS — I10 ESSENTIAL HYPERTENSION: ICD-10-CM

## 2018-09-26 DIAGNOSIS — E78.2 MIXED HYPERLIPIDEMIA: ICD-10-CM

## 2018-09-26 DIAGNOSIS — E03.9 HYPOTHYROIDISM, UNSPECIFIED TYPE: ICD-10-CM

## 2018-09-26 RX ORDER — ATORVASTATIN CALCIUM 10 MG/1
10 TABLET, FILM COATED ORAL DAILY
Qty: 90 TABLET | Refills: 1 | Status: SHIPPED | OUTPATIENT
Start: 2018-09-26 | End: 2019-02-13 | Stop reason: SDUPTHER

## 2018-09-26 RX ORDER — LEVOTHYROXINE SODIUM 0.05 MG/1
50 TABLET ORAL DAILY
Qty: 90 TABLET | Refills: 1 | Status: SHIPPED | OUTPATIENT
Start: 2018-09-26 | End: 2019-02-13 | Stop reason: SDUPTHER

## 2018-09-26 RX ORDER — AMLODIPINE BESYLATE 5 MG/1
5 TABLET ORAL DAILY
Qty: 90 TABLET | Refills: 1 | Status: SHIPPED | OUTPATIENT
Start: 2018-09-26 | End: 2019-02-13 | Stop reason: SDUPTHER

## 2018-10-16 DIAGNOSIS — Z78.0 POSTMENOPAUSAL: Primary | ICD-10-CM

## 2018-11-02 DIAGNOSIS — I10 ESSENTIAL HYPERTENSION: ICD-10-CM

## 2018-11-02 DIAGNOSIS — E03.9 HYPOTHYROIDISM, UNSPECIFIED TYPE: ICD-10-CM

## 2018-11-05 RX ORDER — AMLODIPINE BESYLATE 5 MG/1
5 TABLET ORAL DAILY
Qty: 90 TABLET | Refills: 1 | OUTPATIENT
Start: 2018-11-05

## 2018-11-05 RX ORDER — LEVOTHYROXINE SODIUM 0.05 MG/1
50 TABLET ORAL DAILY
Qty: 90 TABLET | Refills: 1 | OUTPATIENT
Start: 2018-11-05

## 2018-11-14 ENCOUNTER — HOSPITAL ENCOUNTER (OUTPATIENT)
Dept: BONE DENSITY | Facility: HOSPITAL | Age: 83
Discharge: HOME | End: 2018-11-14
Admitting: FAMILY MEDICINE

## 2018-11-14 ENCOUNTER — HOSPITAL ENCOUNTER (EMERGENCY)
Facility: HOSPITAL | Age: 83
Discharge: HOME | End: 2018-11-14
Attending: EMERGENCY MEDICINE | Admitting: EMERGENCY MEDICINE

## 2018-11-14 VITALS
HEIGHT: 63 IN | RESPIRATION RATE: 16 BRPM | HEART RATE: 102 BPM | TEMPERATURE: 97.5 F | BODY MASS INDEX: 21.97 KG/M2 | OXYGEN SATURATION: 98 %

## 2018-11-14 DIAGNOSIS — R04.0 EPISTAXIS: Primary | ICD-10-CM

## 2018-11-14 LAB
ANION GAP SERPL CALCULATED.3IONS-SCNC: 12.2 MMOL/L
BASOPHILS # BLD AUTO: 0.03 10*3/MM3 (ref 0–0.2)
BASOPHILS NFR BLD AUTO: 0.3 % (ref 0–1.5)
BUN BLD-MCNC: 24 MG/DL (ref 8–23)
BUN/CREAT SERPL: 27.9 (ref 7–25)
CALCIUM SPEC-SCNC: 9.2 MG/DL (ref 8.2–9.6)
CHLORIDE SERPL-SCNC: 104 MMOL/L (ref 98–107)
CO2 SERPL-SCNC: 24.8 MMOL/L (ref 22–29)
CREAT BLD-MCNC: 0.86 MG/DL (ref 0.57–1)
DEPRECATED RDW RBC AUTO: 50.5 FL (ref 37–54)
EOSINOPHIL # BLD AUTO: 0.18 10*3/MM3 (ref 0–0.7)
EOSINOPHIL NFR BLD AUTO: 1.9 % (ref 0.3–6.2)
ERYTHROCYTE [DISTWIDTH] IN BLOOD BY AUTOMATED COUNT: 13.4 % (ref 11.7–13)
GFR SERPL CREATININE-BSD FRML MDRD: 62 ML/MIN/1.73
GLUCOSE BLD-MCNC: 115 MG/DL (ref 65–99)
HCT VFR BLD AUTO: 37.3 % (ref 35.6–45.5)
HGB BLD-MCNC: 11.9 G/DL (ref 11.9–15.5)
IMM GRANULOCYTES # BLD: 0.03 10*3/MM3 (ref 0–0.03)
IMM GRANULOCYTES NFR BLD: 0.3 % (ref 0–0.5)
INR PPP: 1.05 (ref 0.9–1.1)
LYMPHOCYTES # BLD AUTO: 2.19 10*3/MM3 (ref 0.9–4.8)
LYMPHOCYTES NFR BLD AUTO: 22.9 % (ref 19.6–45.3)
MCH RBC QN AUTO: 33.1 PG (ref 26.9–32)
MCHC RBC AUTO-ENTMCNC: 31.9 G/DL (ref 32.4–36.3)
MCV RBC AUTO: 103.6 FL (ref 80.5–98.2)
MONOCYTES # BLD AUTO: 1.07 10*3/MM3 (ref 0.2–1.2)
MONOCYTES NFR BLD AUTO: 11.2 % (ref 5–12)
NEUTROPHILS # BLD AUTO: 6.08 10*3/MM3 (ref 1.9–8.1)
NEUTROPHILS NFR BLD AUTO: 63.4 % (ref 42.7–76)
PLATELET # BLD AUTO: 269 10*3/MM3 (ref 140–500)
PMV BLD AUTO: 9.3 FL (ref 6–12)
POTASSIUM BLD-SCNC: 3.5 MMOL/L (ref 3.5–5.2)
PROTHROMBIN TIME: 13.5 SECONDS (ref 11.7–14.2)
RBC # BLD AUTO: 3.6 10*6/MM3 (ref 3.9–5.2)
SODIUM BLD-SCNC: 141 MMOL/L (ref 136–145)
WBC NRBC COR # BLD: 9.58 10*3/MM3 (ref 4.5–10.7)

## 2018-11-14 PROCEDURE — 77080 DXA BONE DENSITY AXIAL: CPT

## 2018-11-14 PROCEDURE — 80048 BASIC METABOLIC PNL TOTAL CA: CPT | Performed by: PHYSICIAN ASSISTANT

## 2018-11-14 PROCEDURE — 85610 PROTHROMBIN TIME: CPT | Performed by: PHYSICIAN ASSISTANT

## 2018-11-14 PROCEDURE — 85025 COMPLETE CBC W/AUTO DIFF WBC: CPT | Performed by: PHYSICIAN ASSISTANT

## 2018-11-14 PROCEDURE — 99283 EMERGENCY DEPT VISIT LOW MDM: CPT

## 2018-11-14 NOTE — ED PROVIDER NOTES
Pt presents to ED c/o an episode of epistaxis that began yesterday night and has resolved. Pt also c/o mild coughing with the bleeding which resolved quickly, but denies light headedness, vomiting, fever, recent cold symptoms. Pt reports a similar episode 3 days ago.    Upon exam,   pt is A&O x3,   lungs are CTAB, CV reg rate nontachycardic, radial pulses strong   oropharynx is clear, there is no septal hematoma, and there is dried blood in the L nare.    Discussed with pt plan to discharge since pt bleeding has been controlled and pt has no acute abnormalities seen in labs. Pt should use a humidifier to increase moisture. Pt should f/u with PCP and ENT. Pt understands and agrees with the plan, all questions answered.    MD ATTESTATION NOTE    The ZULAY and I have discussed this patient's history, physical exam, and treatment plan.  I have reviewed the documentation and personally had a face to face interaction with the patient. I affirm the documentation and agree with the treatment and plan.  The attached note describes my personal findings.    Documentation assistance provided by jensen Tran for Dr. Alvraes.  Information recorded by the jensen was done at my direction and has been verified and validated by me.     Belkis Tran  11/14/18 [id number]       Ilsa Alvares MD  11/15/18 [id number]

## 2018-11-14 NOTE — ED NOTES
Pt reports multiple nose bleeds x a few days. Pt with most recent nose bleed tonight. Bleeding controlled. Pt take 81mg ASA daily.       Alana Butler, RN  11/14/18 [id number]

## 2018-11-14 NOTE — ED PROVIDER NOTES
EMERGENCY DEPARTMENT ENCOUNTER    CHIEF COMPLAINT  Chief Complaint: nose bleeds  History given by: patient  History limited by: none  Room Number: 13/13  PMD: Kory Bell MD      HPI:  Pt is a 89 y.o. female who presents complaining of off and on nose bleeds for the past 3 days. Pt states that the blood comes mostly out of the left nostril. Pt's daughter states that the blood had obvious blood clots in it. Pt denies picking her nose or excessive blowing of her nose. Pt takes one 81 mg ASA a day. Pt denies dizziness and weakness.    Duration:  3 days  Onset: gradual  Timing: off and on  Location: left nostril  Radiation: n/a  Quality: nose bleed  Intensity/Severity: mild  Progression: constant  Associated Symptoms: none  Aggravating Factors: none  Alleviating Factors: none  Previous Episodes: none  Treatment before arrival: none    PAST MEDICAL HISTORY  Active Ambulatory Problems     Diagnosis Date Noted   • Essential hypertension 12/14/2015   • Hyperlipidemia 12/14/2015   • Osteopenia 12/14/2015   • Hypothyroidism 02/10/2016     Resolved Ambulatory Problems     Diagnosis Date Noted   • No Resolved Ambulatory Problems     Past Medical History:   Diagnosis Date   • H/O bone density study 08/02/2016   • H/O complete eye exam 2015   • Hyperlipidemia    • Hypertension    • Hypothyroidism    • Osteoporosis    • Palpitations        PAST SURGICAL HISTORY  Past Surgical History:   Procedure Laterality Date   • ADENOIDECTOMY     • APPENDECTOMY     • COLONOSCOPY  2011   • MAMMO BILATERAL  07/26/2013   • PAP SMEAR  UNKNOWN       FAMILY HISTORY  Family History   Problem Relation Age of Onset   • COPD Mother    • Depression Other    • Heart disease Other    • Hypertension Other    • Stroke Other    • Thyroid disease Other        SOCIAL HISTORY  Social History     Socioeconomic History   • Marital status:      Spouse name: Not on file   • Number of children: Not on file   • Years of education: Not on file   • Highest  education level: Not on file   Social Needs   • Financial resource strain: Not on file   • Food insecurity - worry: Not on file   • Food insecurity - inability: Not on file   • Transportation needs - medical: Not on file   • Transportation needs - non-medical: Not on file   Occupational History   • Not on file   Tobacco Use   • Smoking status: Never Smoker   • Smokeless tobacco: Never Used   Substance and Sexual Activity   • Alcohol use: No   • Drug use: No   • Sexual activity: Defer   Other Topics Concern   • Not on file   Social History Narrative   • Not on file       ALLERGIES  Patient has no known allergies.    REVIEW OF SYSTEMS  Review of Systems   Constitutional: Negative for fever.   HENT: Positive for nosebleeds (off and on). Negative for sore throat.    Eyes: Negative.    Respiratory: Negative for cough and shortness of breath.    Cardiovascular: Negative for chest pain.   Gastrointestinal: Negative for abdominal pain, diarrhea and vomiting.   Genitourinary: Negative for dysuria.   Musculoskeletal: Negative for neck pain.   Skin: Negative for rash.   Allergic/Immunologic: Negative.    Neurological: Negative for weakness, numbness and headaches.   Hematological: Negative.    Psychiatric/Behavioral: Negative.    All other systems reviewed and are negative.      PHYSICAL EXAM  ED Triage Vitals [11/14/18 0312]   Temp Heart Rate Resp BP SpO2   97.5 °F (36.4 °C) 102 16 -- 98 %      Temp src Heart Rate Source Patient Position BP Location FiO2 (%)   Tympanic Monitor -- -- --       Physical Exam   Constitutional: She is oriented to person, place, and time. No distress.   HENT:   Head: Normocephalic and atraumatic.   Mouth/Throat: Oropharynx is clear and moist.   R nares is nml  L nares has minimal dried blood with no active bleeding   Eyes: EOM are normal. Pupils are equal, round, and reactive to light.   Neck: Normal range of motion. Neck supple.   Cardiovascular: Normal rate, regular rhythm and normal heart  sounds.   Pulmonary/Chest: Effort normal and breath sounds normal. No respiratory distress.   Abdominal: Soft. There is no tenderness. There is no rebound and no guarding.   Musculoskeletal: Normal range of motion. She exhibits no edema.   Neurological: She is alert and oriented to person, place, and time. She has normal sensation and normal strength.   Skin: Skin is warm and dry. No ecchymosis, no petechiae and no rash noted.   Psychiatric: Mood and affect normal.   Nursing note and vitals reviewed.      LAB RESULTS  Lab Results (last 24 hours)     Procedure Component Value Units Date/Time    CBC & Differential [[id number]] Collected:  11/14/18 0331    Specimen:  Blood Updated:  11/14/18 0342    Narrative:       The following orders were created for panel order CBC & Differential.  Procedure                               Abnormality         Status                     ---------                               -----------         ------                     CBC Auto Differential[[id number]]        Abnormal            Final result                 Please view results for these tests on the individual orders.    Basic Metabolic Panel [[id number]]  (Abnormal) Collected:  11/14/18 0331    Specimen:  Blood Updated:  11/14/18 0402     Glucose 115 mg/dL      BUN 24 mg/dL      Creatinine 0.86 mg/dL      Sodium 141 mmol/L      Potassium 3.5 mmol/L      Chloride 104 mmol/L      CO2 24.8 mmol/L      Calcium 9.2 mg/dL      eGFR Non African Amer 62 mL/min/1.73      BUN/Creatinine Ratio 27.9     Anion Gap 12.2 mmol/L     Narrative:       The MDRD GFR formula is only valid for adults with stable renal function between ages 18 and 70.    Protime-INR [[id number]]  (Normal) Collected:  11/14/18 0331    Specimen:  Blood Updated:  11/14/18 0352     Protime 13.5 Seconds      INR 1.05    CBC Auto Differential [[id number]]  (Abnormal) Collected:  11/14/18 0331    Specimen:  Blood Updated:  11/14/18 0342     WBC 9.58 10*3/mm3      RBC 3.60 10*6/mm3       Hemoglobin 11.9 g/dL      Hematocrit 37.3 %      .6 fL      MCH 33.1 pg      MCHC 31.9 g/dL      RDW 13.4 %      RDW-SD 50.5 fl      MPV 9.3 fL      Platelets 269 10*3/mm3      Neutrophil % 63.4 %      Lymphocyte % 22.9 %      Monocyte % 11.2 %      Eosinophil % 1.9 %      Basophil % 0.3 %      Immature Grans % 0.3 %      Neutrophils, Absolute 6.08 10*3/mm3      Lymphocytes, Absolute 2.19 10*3/mm3      Monocytes, Absolute 1.07 10*3/mm3      Eosinophils, Absolute 0.18 10*3/mm3      Basophils, Absolute 0.03 10*3/mm3      Immature Grans, Absolute 0.03 10*3/mm3           I ordered the above labs and reviewed the results    PROCEDURES  Procedures      PROGRESS AND CONSULTS     0322  Discussed plan to order labs for further evaluation of pt condition. Pt understands and agrees with the plan, all questions answered.    0333  Discussed pt case with Dr. Alvares who agrees to review pt case and examine pt.     0324  Ordered labs for further evaluation of pt condition.     0419  Discussed pt with Dr. Alvares who has now personally examined the pt. She agrees with the plan to d/c the pt home. Dr. Alvares discussed this with the pt. Pt understands and agrees with the plan, all questions answered.    MEDICAL DECISION MAKING  Results were reviewed/discussed with the patient and they were also made aware of online access. Pt also made aware that some labs, such as cultures, will not be resulted during ER visit and follow up with PMD is necessary.     MDM  Number of Diagnoses or Management Options     Amount and/or Complexity of Data Reviewed  Clinical lab tests: ordered and reviewed (Protime - 13.5)  Discussion of test results with the performing providers: yes (Dr. Alvares (Emergency Medicine))  Decide to obtain previous medical records or to obtain history from someone other than the patient: yes    Patient Progress  Patient progress: stable         DIAGNOSIS  Final diagnoses:   Epistaxis       DISPOSITION  DISCHARGE    Patient  discharged in stable condition.    Reviewed implications of results, diagnosis, meds, responsibility to follow up, warning signs and symptoms of possible worsening, potential complications and reasons to return to ER.    Patient/Family voiced understanding of above instructions.    Discussed plan for discharge, as there is no emergent indication for admission. Patient referred to primary care provider for BP management due to today's BP. Pt/family is agreeable and understands need for follow up and repeat testing.  Pt is aware that discharge does not mean that nothing is wrong but it indicates no emergency is present that requires admission and they must continue care with follow-up as given below or physician of their choice.     FOLLOW-UP  Kory Bell MD  [address] [facility] [address]  [facility] [id number] [phone]    Schedule an appointment as soon as possible for a visit       Darek Land MD  [address] [facility] [address]  [place] [facility] [address]  [phone]    Schedule an appointment as soon as possible for a visit            Medication List      No changes were made to your prescriptions during this visit.           Latest Documented Vital Signs:  As of 5:08 AM  BP-   HR- 102 Temp- 97.5 °F (36.4 °C) (Tympanic) O2 sat- 98%    --  Documentation assistance provided by jensen Kauffman for SARIKA Bach.  Information recorded by the scribe was done at my direction and has been verified and validated by me.     Kory Kauffman  11/14/18 0421       Max Daigle PA  11/14/18 6123

## 2018-11-14 NOTE — DISCHARGE INSTRUCTIONS
Home, rest, home medicine as prescribed, keep nose moisturized with saline drops, use vaseline or antibiotic ointment, use a humidifier, follow up with PCP or ENT for recheck. Return to care with further concerns.

## 2018-12-07 ENCOUNTER — HOSPITAL ENCOUNTER (EMERGENCY)
Facility: HOSPITAL | Age: 83
Discharge: HOME | End: 2018-12-07
Attending: EMERGENCY MEDICINE | Admitting: EMERGENCY MEDICINE

## 2018-12-07 VITALS
OXYGEN SATURATION: 100 % | BODY MASS INDEX: 22.15 KG/M2 | SYSTOLIC BLOOD PRESSURE: 177 MMHG | TEMPERATURE: 98.1 F | HEIGHT: 63 IN | RESPIRATION RATE: 18 BRPM | WEIGHT: 125 LBS | HEART RATE: 77 BPM | DIASTOLIC BLOOD PRESSURE: 75 MMHG

## 2018-12-07 DIAGNOSIS — R04.0 LEFT-SIDED EPISTAXIS: Primary | ICD-10-CM

## 2018-12-07 LAB
ALBUMIN SERPL-MCNC: 4.1 G/DL (ref 3.5–5.2)
ALBUMIN/GLOB SERPL: 1.6 G/DL
ALP SERPL-CCNC: 71 U/L (ref 39–117)
ALT SERPL W P-5'-P-CCNC: 12 U/L (ref 1–33)
ANION GAP SERPL CALCULATED.3IONS-SCNC: 9.2 MMOL/L
AST SERPL-CCNC: 16 U/L (ref 1–32)
BASOPHILS # BLD AUTO: 0.04 10*3/MM3 (ref 0–0.2)
BASOPHILS NFR BLD AUTO: 0.5 % (ref 0–1.5)
BILIRUB SERPL-MCNC: 0.3 MG/DL (ref 0.1–1.2)
BUN BLD-MCNC: 16 MG/DL (ref 8–23)
BUN/CREAT SERPL: 21.3 (ref 7–25)
CALCIUM SPEC-SCNC: 9.4 MG/DL (ref 8.2–9.6)
CHLORIDE SERPL-SCNC: 106 MMOL/L (ref 98–107)
CO2 SERPL-SCNC: 28.8 MMOL/L (ref 22–29)
CREAT BLD-MCNC: 0.75 MG/DL (ref 0.57–1)
DEPRECATED RDW RBC AUTO: 51.6 FL (ref 37–54)
EOSINOPHIL # BLD AUTO: 0.12 10*3/MM3 (ref 0–0.7)
EOSINOPHIL NFR BLD AUTO: 1.5 % (ref 0.3–6.2)
ERYTHROCYTE [DISTWIDTH] IN BLOOD BY AUTOMATED COUNT: 14 % (ref 11.7–13)
GFR SERPL CREATININE-BSD FRML MDRD: 73 ML/MIN/1.73
GLOBULIN UR ELPH-MCNC: 2.6 GM/DL
GLUCOSE BLD-MCNC: 100 MG/DL (ref 65–99)
HCT VFR BLD AUTO: 38 % (ref 35.6–45.5)
HGB BLD-MCNC: 12.5 G/DL (ref 11.9–15.5)
IMM GRANULOCYTES # BLD: 0.01 10*3/MM3 (ref 0–0.03)
IMM GRANULOCYTES NFR BLD: 0.1 % (ref 0–0.5)
LYMPHOCYTES # BLD AUTO: 2.68 10*3/MM3 (ref 0.9–4.8)
LYMPHOCYTES NFR BLD AUTO: 34.5 % (ref 19.6–45.3)
MCH RBC QN AUTO: 33.2 PG (ref 26.9–32)
MCHC RBC AUTO-ENTMCNC: 32.9 G/DL (ref 32.4–36.3)
MCV RBC AUTO: 101.1 FL (ref 80.5–98.2)
MONOCYTES # BLD AUTO: 0.56 10*3/MM3 (ref 0.2–1.2)
MONOCYTES NFR BLD AUTO: 7.2 % (ref 5–12)
NEUTROPHILS # BLD AUTO: 4.37 10*3/MM3 (ref 1.9–8.1)
NEUTROPHILS NFR BLD AUTO: 56.3 % (ref 42.7–76)
PLATELET # BLD AUTO: 269 10*3/MM3 (ref 140–500)
PMV BLD AUTO: 9.9 FL (ref 6–12)
POTASSIUM BLD-SCNC: 3.6 MMOL/L (ref 3.5–5.2)
PROT SERPL-MCNC: 6.7 G/DL (ref 6–8.5)
RBC # BLD AUTO: 3.76 10*6/MM3 (ref 3.9–5.2)
SODIUM BLD-SCNC: 144 MMOL/L (ref 136–145)
WBC NRBC COR # BLD: 7.77 10*3/MM3 (ref 4.5–10.7)

## 2018-12-07 PROCEDURE — 85025 COMPLETE CBC W/AUTO DIFF WBC: CPT | Performed by: NURSE PRACTITIONER

## 2018-12-07 PROCEDURE — 99283 EMERGENCY DEPT VISIT LOW MDM: CPT

## 2018-12-07 PROCEDURE — 80053 COMPREHEN METABOLIC PANEL: CPT | Performed by: NURSE PRACTITIONER

## 2018-12-07 RX ADMIN — Medication 2 SPRAY: at 17:49

## 2018-12-07 NOTE — ED PROVIDER NOTES
Pt is a 89 y.o. female who presents to the ED complaining of intermittent left epistaxis, with last episode since 1330 today. Pt was seen by Dr. Land, ENT several weeks ago. Pt states she had no active bleeding at that time but reports several intermittent episodes since then. Pt states she uses a humidifier at home.    On exam,  HENT: Dry blood in left nare    Plan: Empirically treat epistaxis, check labs, and discharge pt to f/u with ENT    1750  Applied va-synephrine spray to nares bilaterally.  Packed by Jade Rubi.    MD ATTESTATION NOTE    The ZULAY and I have discussed this patient's history, physical exam, and treatment plan.  I have reviewed the documentation and personally had a face to face interaction with the patient. I affirm the documentation and agree with the treatment and plan.  The attached note describes my personal findings.      Documentation assistance provided by jensen Jameson for Dr. Corral. Information recorded by the scribe was done at my direction and has been verified and validated by me.         Kory Jameson  12/07/18 2008       Omid Corral MD  12/07/18 2056

## 2018-12-07 NOTE — ED PROVIDER NOTES
"EMERGENCY DEPARTMENT ENCOUNTER    Room Number:  07/07  Date seen:  12/7/2018  Time seen: 5:24 PM  PCP: Kory Bell MD   ENT: Dr. Land  Historian: Patient, Family      HPI:  Chief Complaint: Epistaxis  Context: Blanca Weldon is a 89 y.o. female who presents to the ED c/o intermittent episodes of epistaxis onset about 3 weeks ago. Pt reports that she was seen for this initially in the ER and at her ENT's office several weeks ago (Per family, \"Of course during both episodes, [pt] didn't have any active bleeding when she was seen in the ER and ENT's office\"). Pt states that her most recent episode of epistaxis began at about 13:30 today. The bleeding is predominantly from the left nare. Pt has applied pressure to the nares with some sx relief. Pt denies bloody stools, chest pain, dyspnea, dizziness/lightheadedness, generalized weakness, excessive gum bleeding, cough, and congestion. Pt states that she typically takes ASA 81 mg (last took it about 5 days ago), but is not taking any other blood thinners currently. There are no other complaints at this time.     Location: Left nare  Radiation: None  Character: \"nosebleed\"  Duration: Onset about 3 weeks ago (most recent episode began at about 13:30 today)  Severity: Moderate  Progression: Waxing and waning  Aggravating Factors: Nothing  Alleviating Factors: Applying pressure to the nares        MEDICAL RECORD REVIEW    Pt was seen in the ER on 11/14/18 secondary to epistaxis. Pt's Hgb was 11.9. Pt had no active epistaxis in the ER.           ALLERGIES  Patient has no known allergies.    PAST MEDICAL HISTORY  Active Ambulatory Problems     Diagnosis Date Noted   • Essential hypertension 12/14/2015   • Hyperlipidemia 12/14/2015   • Osteopenia 12/14/2015   • Hypothyroidism 02/10/2016     Resolved Ambulatory Problems     Diagnosis Date Noted   • No Resolved Ambulatory Problems     Past Medical History:   Diagnosis Date   • H/O bone density study 08/02/2016   • H/O complete " eye exam 2015   • Hyperlipidemia    • Hypertension    • Hypothyroidism    • Osteoporosis    • Palpitations        PAST SURGICAL HISTORY  Past Surgical History:   Procedure Laterality Date   • ADENOIDECTOMY     • APPENDECTOMY     • COLONOSCOPY  2011   • MAMMO BILATERAL  07/26/2013   • PAP SMEAR  UNKNOWN       FAMILY HISTORY  Family History   Problem Relation Age of Onset   • COPD Mother    • Depression Other    • Heart disease Other    • Hypertension Other    • Stroke Other    • Thyroid disease Other        SOCIAL HISTORY  Social History     Socioeconomic History   • Marital status:      Spouse name: Not on file   • Number of children: Not on file   • Years of education: Not on file   • Highest education level: Not on file   Social Needs   • Financial resource strain: Not on file   • Food insecurity - worry: Not on file   • Food insecurity - inability: Not on file   • Transportation needs - medical: Not on file   • Transportation needs - non-medical: Not on file   Occupational History   • Not on file   Tobacco Use   • Smoking status: Never Smoker   • Smokeless tobacco: Never Used   Substance and Sexual Activity   • Alcohol use: No   • Drug use: No   • Sexual activity: Defer   Other Topics Concern   • Not on file   Social History Narrative   • Not on file           REVIEW OF SYSTEMS  Review of Systems   Constitutional: Negative for chills.   HENT: Positive for nosebleeds. Negative for congestion, rhinorrhea and sore throat.    Eyes: Negative for pain.   Respiratory: Negative for cough and shortness of breath.    Cardiovascular: Negative for chest pain, palpitations and leg swelling.   Gastrointestinal: Negative for abdominal pain, blood in stool, diarrhea, nausea and vomiting.   Genitourinary: Negative for difficulty urinating, dysuria, flank pain and frequency.   Musculoskeletal: Negative for myalgias, neck pain and neck stiffness.   Skin: Negative for rash.   Neurological: Negative for dizziness, speech  difficulty, weakness, light-headedness, numbness and headaches.   Psychiatric/Behavioral: Negative.    All other systems reviewed and are negative.          PHYSICAL EXAM  ED Triage Vitals   Temp Heart Rate Resp BP SpO2   12/07/18 1656 12/07/18 1656 12/07/18 1656 12/07/18 1742 12/07/18 1656   98.1 °F (36.7 °C) 103 18 180/74 100 %      Temp src Heart Rate Source Patient Position BP Location FiO2 (%)   -- -- -- -- --              Physical Exam   Constitutional: She is oriented to person, place, and time. No distress.   HENT:   There is a nasal clamp in place - There is dried blood in the anterior left nare upon removal of the nasal clamp. Within several minutes after the nasal clamp was removed, pt developed active epistaxis from the left nare.    Eyes: EOM are normal. Pupils are equal, round, and reactive to light.   Neck: Normal range of motion. Neck supple.   Cardiovascular: Normal rate and intact distal pulses.   Pulmonary/Chest: Effort normal. She exhibits no tenderness.   Abdominal: Soft. There is no tenderness.   Neurological: She is alert and oriented to person, place, and time. She has normal sensation and normal strength.   Skin: Skin is warm.   Psychiatric: Mood and affect normal.   Nursing note and vitals reviewed.          LAB RESULTS  Recent Results (from the past 24 hour(s))   Comprehensive Metabolic Panel    Collection Time: 12/07/18  5:52 PM   Result Value Ref Range    Glucose 100 (H) 65 - 99 mg/dL    BUN 16 8 - 23 mg/dL    Creatinine 0.75 0.57 - 1.00 mg/dL    Sodium 144 136 - 145 mmol/L    Potassium 3.6 3.5 - 5.2 mmol/L    Chloride 106 98 - 107 mmol/L    CO2 28.8 22.0 - 29.0 mmol/L    Calcium 9.4 8.2 - 9.6 mg/dL    Total Protein 6.7 6.0 - 8.5 g/dL    Albumin 4.10 3.50 - 5.20 g/dL    ALT (SGPT) 12 1 - 33 U/L    AST (SGOT) 16 1 - 32 U/L    Alkaline Phosphatase 71 39 - 117 U/L    Total Bilirubin 0.3 0.1 - 1.2 mg/dL    eGFR Non African Amer 73 >60 mL/min/1.73    Globulin 2.6 gm/dL    A/G Ratio 1.6 g/dL     BUN/Creatinine Ratio 21.3 7.0 - 25.0    Anion Gap 9.2 mmol/L   CBC Auto Differential    Collection Time: 12/07/18  5:52 PM   Result Value Ref Range    WBC 7.77 4.50 - 10.70 10*3/mm3    RBC 3.76 (L) 3.90 - 5.20 10*6/mm3    Hemoglobin 12.5 11.9 - 15.5 g/dL    Hematocrit 38.0 35.6 - 45.5 %    .1 (H) 80.5 - 98.2 fL    MCH 33.2 (H) 26.9 - 32.0 pg    MCHC 32.9 32.4 - 36.3 g/dL    RDW 14.0 (H) 11.7 - 13.0 %    RDW-SD 51.6 37.0 - 54.0 fl    MPV 9.9 6.0 - 12.0 fL    Platelets 269 140 - 500 10*3/mm3    Neutrophil % 56.3 42.7 - 76.0 %    Lymphocyte % 34.5 19.6 - 45.3 %    Monocyte % 7.2 5.0 - 12.0 %    Eosinophil % 1.5 0.3 - 6.2 %    Basophil % 0.5 0.0 - 1.5 %    Immature Grans % 0.1 0.0 - 0.5 %    Neutrophils, Absolute 4.37 1.90 - 8.10 10*3/mm3    Lymphocytes, Absolute 2.68 0.90 - 4.80 10*3/mm3    Monocytes, Absolute 0.56 0.20 - 1.20 10*3/mm3    Eosinophils, Absolute 0.12 0.00 - 0.70 10*3/mm3    Basophils, Absolute 0.04 0.00 - 0.20 10*3/mm3    Immature Grans, Absolute 0.01 0.00 - 0.03 10*3/mm3       I ordered the above labs and reviewed the results.      MEDICATIONS GIVEN IN ER  Medications   phenylephrine (SHYANNE-SYNEPHRINE) 0.5 % nasal spray 2 spray (2 sprays Nasal Given 12/7/18 1749)           PROCEDURES  Epistaxis Management  Date/Time: 12/7/2018 7:05 PM  Performed by: Jade Rubi APRN  Authorized by: Omid Corral MD     Consent:     Consent obtained:  Verbal    Consent given by:  Patient    Risks discussed:  Bleeding, infection, nasal injury and pain  Anesthesia (see MAR for exact dosages):     Anesthesia method:  None  Procedure details:     Treatment site:  L anterior    Treatment method:  Anterior pack    Treatment episode: recurring    Post-procedure details:     Assessment:  Bleeding stopped    Patient tolerance of procedure:  Tolerated well, no immediate complications              PROGRESS AND CONSULTS    Progress Notes:          5:34 PM:  Blood work ordered for further evaluation. Pt will  "have Isreal-Synephrine applied to the nares bilaterally.     5:37 PM:  Reviewed pt's history and workup with Dr. Corral (ER physician).  After a bedside evaluation, Dr. Corral agrees with the plan of care.    6:17 PM:  Rechecked pt. Pt's nasal clamp was removed. There is no active epistaxis at this time. Will monitor pt in the ER to determine further course of care.     7:00 PM:  Rechecked pt. Informed pt and family that pt's Hgb is 12.5. Pt initially declined nasal packing for epistaxis management. However, pt has had two episodes of active epistaxis in the ER. Therefore, pt's left nare was packed. Pt tolerated the procedure without significant difficulty. Pt was strongly advised to f/u with her ENT. Strict RTER warnings given. Pt and family agree with plan for discharge.     7:20PM:   Ambulated pt in the ED. No bleeding. Monitored for 20min post placement of the packing, no bleeding. Tolerated well.       Disposition vitals:  /74   Pulse 103   Temp 98.1 °F (36.7 °C)   Resp 18   Ht 160 cm (63\")   Wt 56.7 kg (125 lb)   SpO2 100%   BMI 22.14 kg/m²           DIAGNOSIS  Final diagnoses:   Left-sided epistaxis           DISPOSITION  Pt discharged.    DISCHARGE    Patient discharged in stable condition.    Reviewed implications of results, diagnosis, meds, responsibility to follow up, warning signs and symptoms of possible worsening, potential complications and reasons to return to ER.    Patient/Family voiced understanding of above instructions.    Discussed plan for discharge, as there is no emergent indication for admission. Pt/family is agreeable and understands need for follow up and repeat testing. Pt is aware that discharge does not mean that nothing is wrong but it indicates no emergency is present that requires admission and they must continue care with follow-up as given below or physician of their choice.     FOLLOW-UP  Kory Bell MD  [address] [address] " [id number]  [phone]    Call       Darek Land MD  [address] [facility] [address]  [place] [id number]  [phone]              Documentation assistance provided by jensen Jacob for BESSY Taylor.  Information recorded by the scribe was done at my direction and has been verified and validated by me.           Bouchra Jacob  12/07/18 1913       Jade Rubi APRN  12/07/18 1929       Jade Rubi APRN  12/07/18 1931

## 2018-12-08 NOTE — DISCHARGE INSTRUCTIONS
Home to rest   Continue vaseline and ocean nasal spray to right nare  Leave nasal packing in place.  Follow up with ENT .  At any time return for SOA, weakness, passing out, lightheadedness or fever.  Return if worse or new concerns   Continue care with your primary care physician and have your blood pressure regularly checked and managed. Normal blood pressure is 120/80.

## 2019-02-13 ENCOUNTER — OFFICE VISIT (OUTPATIENT)
Dept: FAMILY MEDICINE CLINIC | Facility: CLINIC | Age: 84
End: 2019-02-13

## 2019-02-13 VITALS
OXYGEN SATURATION: 99 % | RESPIRATION RATE: 16 BRPM | TEMPERATURE: 97.8 F | HEIGHT: 63 IN | WEIGHT: 118 LBS | DIASTOLIC BLOOD PRESSURE: 72 MMHG | SYSTOLIC BLOOD PRESSURE: 112 MMHG | HEART RATE: 71 BPM | BODY MASS INDEX: 20.91 KG/M2

## 2019-02-13 DIAGNOSIS — E78.2 MIXED HYPERLIPIDEMIA: ICD-10-CM

## 2019-02-13 DIAGNOSIS — E03.9 ACQUIRED HYPOTHYROIDISM: ICD-10-CM

## 2019-02-13 DIAGNOSIS — E55.9 VITAMIN D DEFICIENCY: ICD-10-CM

## 2019-02-13 DIAGNOSIS — I10 ESSENTIAL HYPERTENSION: Primary | ICD-10-CM

## 2019-02-13 DIAGNOSIS — M85.80 OSTEOPENIA, UNSPECIFIED LOCATION: ICD-10-CM

## 2019-02-13 DIAGNOSIS — E03.9 HYPOTHYROIDISM, UNSPECIFIED TYPE: ICD-10-CM

## 2019-02-13 PROCEDURE — 99214 OFFICE O/P EST MOD 30 MIN: CPT | Performed by: PHYSICIAN ASSISTANT

## 2019-02-13 RX ORDER — AMLODIPINE BESYLATE 5 MG/1
5 TABLET ORAL DAILY
Qty: 90 TABLET | Refills: 1 | Status: SHIPPED | OUTPATIENT
Start: 2019-02-13 | End: 2019-07-31 | Stop reason: SDUPTHER

## 2019-02-13 RX ORDER — ATORVASTATIN CALCIUM 10 MG/1
10 TABLET, FILM COATED ORAL DAILY
Qty: 90 TABLET | Refills: 3 | Status: SHIPPED | OUTPATIENT
Start: 2019-02-13

## 2019-02-13 RX ORDER — LEVOTHYROXINE SODIUM 0.05 MG/1
50 TABLET ORAL DAILY
Qty: 90 TABLET | Refills: 3 | Status: SHIPPED | OUTPATIENT
Start: 2019-02-13

## 2019-02-13 NOTE — PATIENT INSTRUCTIONS
Had DEXA bone density Nov and see severe Osteopenia;  With age and hypothyroid would suggest Rx like Actonel or Fosamax----I would want to prevent fracture  Refer to GI if nausea changes  Watch weight; I know working on getting her up to have breakfast  Low glycemic index diet  Exercise 30 minutes most days of the week  Make sure you get results on any labs or tests we ordered today  We discussed medications and how to take them as prescribed  Sleep 6-8 hours each night if possible  If you have not signed up for [employer], please activate your code ASAP from your After Visit Summary today    LDL goal <100  LDL goal if heart disease <70  HDL goal >60  Triglyceride goal <150  BP goal =<130/80  Fasting glucose <100

## 2019-02-13 NOTE — PROGRESS NOTES
Subjective   Blanca Weldon is a 89 y.o. female.     History of Present Illness   Blanca Weldon 89 y.o. female who presents today for routine follow up check and medication refills.  she has a history of   Patient Active Problem List   Diagnosis   • Essential hypertension   • Hyperlipidemia   • Osteopenia   • Hypothyroidism   .  Since the last visit, she has overall felt well.  She has Hypertenision and is well controlled on medication, Hyperlipidemia and is well controlled on medication, Hypothyroidism and will need to update labs for continued treatment and Vitamin D deficiency and will update labs to confirm level is at goal >30.  she has been compliant with current medications have reviewed them.  The patient denies medication side effects.  Weight is down and sometimes skips breakfast if sleeps in    Results for orders placed or performed during the hospital encounter of 12/07/18   Comprehensive Metabolic Panel   Result Value Ref Range    Glucose 100 (H) 65 - 99 mg/dL    BUN 16 8 - 23 mg/dL    Creatinine 0.75 0.57 - 1.00 mg/dL    Sodium 144 136 - 145 mmol/L    Potassium 3.6 3.5 - 5.2 mmol/L    Chloride 106 98 - 107 mmol/L    CO2 28.8 22.0 - 29.0 mmol/L    Calcium 9.4 8.2 - 9.6 mg/dL    Total Protein 6.7 6.0 - 8.5 g/dL    Albumin 4.10 3.50 - 5.20 g/dL    ALT (SGPT) 12 1 - 33 U/L    AST (SGOT) 16 1 - 32 U/L    Alkaline Phosphatase 71 39 - 117 U/L    Total Bilirubin 0.3 0.1 - 1.2 mg/dL    eGFR Non African Amer 73 >60 mL/min/1.73    Globulin 2.6 gm/dL    A/G Ratio 1.6 g/dL    BUN/Creatinine Ratio 21.3 7.0 - 25.0    Anion Gap 9.2 mmol/L   CBC Auto Differential   Result Value Ref Range    WBC 7.77 4.50 - 10.70 10*3/mm3    RBC 3.76 (L) 3.90 - 5.20 10*6/mm3    Hemoglobin 12.5 11.9 - 15.5 g/dL    Hematocrit 38.0 35.6 - 45.5 %    .1 (H) 80.5 - 98.2 fL    MCH 33.2 (H) 26.9 - 32.0 pg    MCHC 32.9 32.4 - 36.3 g/dL    RDW 14.0 (H) 11.7 - 13.0 %    RDW-SD 51.6 37.0 - 54.0 fl    MPV 9.9 6.0 - 12.0 fL    Platelets 269 140 -  500 10*3/mm3    Neutrophil % 56.3 42.7 - 76.0 %    Lymphocyte % 34.5 19.6 - 45.3 %    Monocyte % 7.2 5.0 - 12.0 %    Eosinophil % 1.5 0.3 - 6.2 %    Basophil % 0.5 0.0 - 1.5 %    Immature Grans % 0.1 0.0 - 0.5 %    Neutrophils, Absolute 4.37 1.90 - 8.10 10*3/mm3    Lymphocytes, Absolute 2.68 0.90 - 4.80 10*3/mm3    Monocytes, Absolute 0.56 0.20 - 1.20 10*3/mm3    Eosinophils, Absolute 0.12 0.00 - 0.70 10*3/mm3    Basophils, Absolute 0.04 0.00 - 0.20 10*3/mm3    Immature Grans, Absolute 0.01 0.00 - 0.03 10*3/mm3     Est CC 41.26    She went ER X 2 mos ago for nose bleeds and stopped ASA and using humidifier  Nausea in AM 5/7 days a week.  I can send her to GI    I will need MCV was 103 and 101 and does need B12, folate  Eats well.  Gingerale helps nausea in AM    Daughter sent me note about above and dementia; I will update labs for this also.  It will progress with age. Has 24/7 care.  Can see neuro if desires     Nov 18  FINDINGS: The bone mineral density in the lumbar spine has a T value of  +0.5 which is normal. The bone mineral density in the left femoral neck  has a T value of -2.1 representing severe osteopenia. The bone mineral  density in the right femoral neck has a T value of -2.2 representing  severe osteopenia    I do suggest starting Fosamax d/t severe osteopenia and hypothyroid; also age    The following portions of the patient's history were reviewed and updated as appropriate: allergies, current medications, past family history, past medical history, past social history, past surgical history and problem list.    Review of Systems   Constitutional: Negative for activity change, appetite change and unexpected weight change.   HENT: Negative for nosebleeds and trouble swallowing.    Eyes: Positive for visual disturbance. Negative for pain.   Respiratory: Negative for chest tightness, shortness of breath and wheezing.    Cardiovascular: Negative for chest pain and palpitations.   Gastrointestinal:  Positive for nausea. Negative for abdominal pain and blood in stool.   Endocrine: Negative.    Genitourinary: Negative for difficulty urinating and hematuria.   Musculoskeletal: Negative for joint swelling.   Skin: Negative for color change and rash.   Allergic/Immunologic: Negative.    Neurological: Negative for syncope and speech difficulty.   Hematological: Negative for adenopathy.   Psychiatric/Behavioral: Negative for agitation and confusion.   All other systems reviewed and are negative.      Objective   Physical Exam   Constitutional: She is oriented to person, place, and time. She appears well-developed and well-nourished. No distress.   HENT:   Head: Normocephalic and atraumatic.   Eyes: Conjunctivae and EOM are normal. Pupils are equal, round, and reactive to light. Right eye exhibits no discharge. Left eye exhibits no discharge. No scleral icterus.   Neck: Normal range of motion. Neck supple. No tracheal deviation present. No thyromegaly present.   Cardiovascular: Normal rate, regular rhythm, normal heart sounds, intact distal pulses and normal pulses. Exam reveals no gallop.   No murmur heard.  Pulmonary/Chest: Effort normal and breath sounds normal. No respiratory distress. She has no wheezes. She has no rales.   Musculoskeletal: Normal range of motion.   Neurological: She is alert and oriented to person, place, and time. She exhibits normal muscle tone. Coordination normal.   Skin: Skin is warm. No rash noted. No erythema. No pallor.   Psychiatric: She has a normal mood and affect. Her behavior is normal. Judgment and thought content normal.   Nursing note and vitals reviewed.      Assessment/Plan   Problems Addressed this Visit        Cardiovascular and Mediastinum    Essential hypertension - Primary    Hyperlipidemia       Endocrine    Hypothyroidism       Musculoskeletal and Integument    Osteopenia

## 2019-03-23 LAB
25(OH)D3+25(OH)D2 SERPL-MCNC: 35 NG/ML (ref 30–100)
ALBUMIN SERPL-MCNC: 4.1 G/DL (ref 3.5–5.2)
ALBUMIN/GLOB SERPL: 1.6 G/DL
ALP SERPL-CCNC: 61 U/L (ref 39–117)
ALT SERPL-CCNC: 17 U/L (ref 1–33)
AST SERPL-CCNC: 19 U/L (ref 1–32)
BASOPHILS # BLD AUTO: 0.04 10*3/MM3 (ref 0–0.2)
BASOPHILS NFR BLD AUTO: 0.6 % (ref 0–1.5)
BILIRUB SERPL-MCNC: 0.4 MG/DL (ref 0.2–1.2)
BUN SERPL-MCNC: 11 MG/DL (ref 8–23)
BUN/CREAT SERPL: 12.4 (ref 7–25)
CALCIUM SERPL-MCNC: 9.7 MG/DL (ref 8.2–9.6)
CHLORIDE SERPL-SCNC: 104 MMOL/L (ref 98–107)
CHOLEST SERPL-MCNC: 152 MG/DL (ref 0–200)
CO2 SERPL-SCNC: 29.1 MMOL/L (ref 22–29)
CREAT SERPL-MCNC: 0.89 MG/DL (ref 0.57–1)
EOSINOPHIL # BLD AUTO: 0.14 10*3/MM3 (ref 0–0.4)
EOSINOPHIL NFR BLD AUTO: 2.1 % (ref 0.3–6.2)
ERYTHROCYTE [DISTWIDTH] IN BLOOD BY AUTOMATED COUNT: 14.1 % (ref 12.3–15.4)
FOLATE SERPL-MCNC: 19.4 NG/ML (ref 4.78–24.2)
GLOBULIN SER CALC-MCNC: 2.5 GM/DL
GLUCOSE SERPL-MCNC: 106 MG/DL (ref 65–99)
HCT VFR BLD AUTO: 44.9 % (ref 34–46.6)
HDLC SERPL-MCNC: 51 MG/DL (ref 40–60)
HGB BLD-MCNC: 14.2 G/DL (ref 12–15.9)
IMM GRANULOCYTES # BLD AUTO: 0.02 10*3/MM3 (ref 0–0.05)
IMM GRANULOCYTES NFR BLD AUTO: 0.3 % (ref 0–0.5)
LDLC SERPL CALC-MCNC: 64 MG/DL (ref 0–100)
LYMPHOCYTES # BLD AUTO: 2.33 10*3/MM3 (ref 0.7–3.1)
LYMPHOCYTES NFR BLD AUTO: 34.2 % (ref 19.6–45.3)
MCH RBC QN AUTO: 32.2 PG (ref 26.6–33)
MCHC RBC AUTO-ENTMCNC: 31.6 G/DL (ref 31.5–35.7)
MCV RBC AUTO: 101.8 FL (ref 79–97)
MONOCYTES # BLD AUTO: 0.51 10*3/MM3 (ref 0.1–0.9)
MONOCYTES NFR BLD AUTO: 7.5 % (ref 5–12)
NEUTROPHILS # BLD AUTO: 3.78 10*3/MM3 (ref 1.4–7)
NEUTROPHILS NFR BLD AUTO: 55.3 % (ref 42.7–76)
NRBC BLD AUTO-RTO: 0 /100 WBC (ref 0–0)
PLATELET # BLD AUTO: 297 10*3/MM3 (ref 140–450)
POTASSIUM SERPL-SCNC: 4 MMOL/L (ref 3.5–5.2)
PROT SERPL-MCNC: 6.6 G/DL (ref 6–8.5)
RBC # BLD AUTO: 4.41 10*6/MM3 (ref 3.77–5.28)
SODIUM SERPL-SCNC: 145 MMOL/L (ref 136–145)
T3FREE SERPL-MCNC: 2.7 PG/ML (ref 2–4.4)
T4 FREE SERPL-MCNC: 1.48 NG/DL (ref 0.93–1.7)
TRIGL SERPL-MCNC: 183 MG/DL (ref 0–150)
TSH SERPL DL<=0.005 MIU/L-ACNC: 1.05 MIU/ML (ref 0.27–4.2)
VIT B12 SERPL-MCNC: 414 PG/ML (ref 211–946)
VLDLC SERPL CALC-MCNC: 36.6 MG/DL (ref 5–40)
WBC # BLD AUTO: 6.82 10*3/MM3 (ref 3.4–10.8)

## 2019-07-31 ENCOUNTER — OFFICE VISIT (OUTPATIENT)
Dept: FAMILY MEDICINE CLINIC | Facility: CLINIC | Age: 84
End: 2019-07-31

## 2019-07-31 VITALS
OXYGEN SATURATION: 97 % | DIASTOLIC BLOOD PRESSURE: 68 MMHG | SYSTOLIC BLOOD PRESSURE: 120 MMHG | RESPIRATION RATE: 16 BRPM | HEART RATE: 70 BPM | WEIGHT: 118 LBS | BODY MASS INDEX: 20.91 KG/M2 | HEIGHT: 63 IN | TEMPERATURE: 98 F

## 2019-07-31 DIAGNOSIS — E87.8 ELECTROLYTE ABNORMALITY: ICD-10-CM

## 2019-07-31 DIAGNOSIS — E78.2 MIXED HYPERLIPIDEMIA: ICD-10-CM

## 2019-07-31 DIAGNOSIS — E03.9 ACQUIRED HYPOTHYROIDISM: ICD-10-CM

## 2019-07-31 DIAGNOSIS — E03.9 HYPOTHYROIDISM, UNSPECIFIED TYPE: ICD-10-CM

## 2019-07-31 DIAGNOSIS — I10 ESSENTIAL HYPERTENSION: Primary | ICD-10-CM

## 2019-07-31 DIAGNOSIS — M85.89 OSTEOPENIA OF MULTIPLE SITES: ICD-10-CM

## 2019-07-31 DIAGNOSIS — F03.90 DEMENTIA WITHOUT BEHAVIORAL DISTURBANCE, UNSPECIFIED DEMENTIA TYPE: ICD-10-CM

## 2019-07-31 PROCEDURE — 99214 OFFICE O/P EST MOD 30 MIN: CPT | Performed by: PHYSICIAN ASSISTANT

## 2019-07-31 RX ORDER — OMEPRAZOLE 20 MG/1
20 CAPSULE, DELAYED RELEASE ORAL DAILY
Qty: 90 CAPSULE | Refills: 3 | Status: SHIPPED | OUTPATIENT
Start: 2019-07-31 | End: 2020-10-18

## 2019-07-31 RX ORDER — AMLODIPINE BESYLATE 5 MG/1
5 TABLET ORAL DAILY
Qty: 90 TABLET | Refills: 1 | Status: SHIPPED | OUTPATIENT
Start: 2019-07-31

## 2019-07-31 RX ORDER — OMEPRAZOLE 20 MG/1
CAPSULE, DELAYED RELEASE ORAL
COMMUNITY
Start: 2019-07-24 | End: 2019-07-31 | Stop reason: SDUPTHER

## 2019-07-31 NOTE — PROGRESS NOTES
Subjective   Blanca Weldon is a 89 y.o. female.     History of Present Illness   Blanca Weldon 89 y.o. female who presents today for routine follow up check and medication refills.  she has a history of   Patient Active Problem List   Diagnosis   • Essential hypertension   • Hyperlipidemia   • Osteopenia   • Hypothyroidism   .  Since the last visit, she has overall felt fairly well.  She has Hypertenision and is well controlled on medication, Hyperlipidemia and is well controlled on medication, Hypothyroidism and is well controlled on Rx.  Labs are in desired treatment range, Vitamin D deficiency and well controlled on medication and labs at goal >30 and no more nausea on PPI.  she has been compliant with current medications have reviewed them.  The patient denies medication side effects.    Results for orders placed or performed in visit on 02/13/19   Comprehensive metabolic panel   Result Value Ref Range    Glucose 106 (H) 65 - 99 mg/dL    BUN 11 8 - 23 mg/dL    Creatinine 0.89 0.57 - 1.00 mg/dL    eGFR Non African Am 59 (L) >60 mL/min/1.73    eGFR African Am 72 >60 mL/min/1.73    BUN/Creatinine Ratio 12.4 7.0 - 25.0    Sodium 145 136 - 145 mmol/L    Potassium 4.0 3.5 - 5.2 mmol/L    Chloride 104 98 - 107 mmol/L    Total CO2 29.1 (H) 22.0 - 29.0 mmol/L    Calcium 9.7 (H) 8.2 - 9.6 mg/dL    Total Protein 6.6 6.0 - 8.5 g/dL    Albumin 4.10 3.50 - 5.20 g/dL    Globulin 2.5 gm/dL    A/G Ratio 1.6 g/dL    Total Bilirubin 0.4 0.2 - 1.2 mg/dL    Alkaline Phosphatase 61 39 - 117 U/L    AST (SGOT) 19 1 - 32 U/L    ALT (SGPT) 17 1 - 33 U/L   Lipid panel   Result Value Ref Range    Total Cholesterol 152 0 - 200 mg/dL    Triglycerides 183 (H) 0 - 150 mg/dL    HDL Cholesterol 51 40 - 60 mg/dL    VLDL Cholesterol 36.6 5 - 40 mg/dL    LDL Cholesterol  64 0 - 100 mg/dL   TSH   Result Value Ref Range    TSH 1.050 0.270 - 4.200 mIU/mL   T3, Free   Result Value Ref Range    T3, Free 2.7 2.0 - 4.4 pg/mL   T4, Free   Result Value Ref  Range    Free T4 1.48 0.93 - 1.70 ng/dL   Vitamin B12   Result Value Ref Range    Vitamin B-12 414 211 - 946 pg/mL   Folate   Result Value Ref Range    Folate 19.40 4.78 - 24.20 ng/mL   Vitamin D 25 Hydroxy   Result Value Ref Range    25 Hydroxy, Vitamin D 35.0 30.0 - 100.0 ng/ml   CBC and Differential   Result Value Ref Range    WBC 6.82 3.40 - 10.80 10*3/mm3    RBC 4.41 3.77 - 5.28 10*6/mm3    Hemoglobin 14.2 12.0 - 15.9 g/dL    Hematocrit 44.9 34.0 - 46.6 %    .8 (H) 79.0 - 97.0 fL    MCH 32.2 26.6 - 33.0 pg    MCHC 31.6 31.5 - 35.7 g/dL    RDW 14.1 12.3 - 15.4 %    Platelets 297 140 - 450 10*3/mm3    Neutrophil Rel % 55.3 42.7 - 76.0 %    Lymphocyte Rel % 34.2 19.6 - 45.3 %    Monocyte Rel % 7.5 5.0 - 12.0 %    Eosinophil Rel % 2.1 0.3 - 6.2 %    Basophil Rel % 0.6 0.0 - 1.5 %    Neutrophils Absolute 3.78 1.40 - 7.00 10*3/mm3    Lymphocytes Absolute 2.33 0.70 - 3.10 10*3/mm3    Monocytes Absolute 0.51 0.10 - 0.90 10*3/mm3    Eosinophils Absolute 0.14 0.00 - 0.40 10*3/mm3    Basophils Absolute 0.04 0.00 - 0.20 10*3/mm3    Immature Granulocyte Rel % 0.3 0.0 - 0.5 %    Immature Grans Absolute 0.02 0.00 - 0.05 10*3/mm3    nRBC 0.0 0.0 - 0.0 /100 WBC       Labs today not fasting    She is intolerant to Fosamax-----  DEXA showed Osteopenia   No fx  Wants to wait on Prolia  She is taking one calcium OTC and want f/u labs  PPI has stopped her daily nausea and I will keep her on this  Moving to daughters to have 24 hour care for memory---dementia    The following portions of the patient's history were reviewed and updated as appropriate: allergies, current medications, past family history, past medical history, past social history, past surgical history and problem list.    Review of Systems   Constitutional: Negative for activity change, appetite change and unexpected weight change.   HENT: Negative for nosebleeds and trouble swallowing.    Eyes: Negative for pain and visual disturbance.   Respiratory: Negative  for chest tightness, shortness of breath and wheezing.    Cardiovascular: Negative for chest pain and palpitations.   Gastrointestinal: Negative for abdominal pain and blood in stool.   Endocrine: Negative.    Genitourinary: Negative for difficulty urinating and hematuria.   Musculoskeletal: Negative for joint swelling.   Skin: Negative for color change and rash.   Allergic/Immunologic: Negative.    Neurological: Negative for syncope and speech difficulty.   Hematological: Negative for adenopathy.   Psychiatric/Behavioral: Negative for agitation and confusion.   All other systems reviewed and are negative.      Objective   Physical Exam   Constitutional: She is oriented to person, place, and time. She appears well-developed and well-nourished. No distress.   HENT:   Head: Normocephalic and atraumatic.   Eyes: Conjunctivae and EOM are normal. Pupils are equal, round, and reactive to light. Right eye exhibits no discharge. Left eye exhibits no discharge. No scleral icterus.   Neck: Normal range of motion. Neck supple. No tracheal deviation present. No thyromegaly present.   Cardiovascular: Normal rate, regular rhythm, normal heart sounds, intact distal pulses and normal pulses. Exam reveals no gallop.   No murmur heard.  Pulmonary/Chest: Effort normal and breath sounds normal. No respiratory distress. She has no wheezes. She has no rales.   Musculoskeletal: Normal range of motion.   Neurological: She is alert and oriented to person, place, and time. She exhibits normal muscle tone. Coordination normal.   Skin: Skin is warm. No rash noted. No erythema. No pallor.   Psychiatric: She has a normal mood and affect. Her behavior is normal. Judgment and thought content normal.   Nursing note and vitals reviewed.      Assessment/Plan   Blanca was seen today for hyperlipidemia.    Diagnoses and all orders for this visit:    Essential hypertension    Mixed hyperlipidemia    Acquired hypothyroidism    Osteopenia of multiple  sites        In summary, Blanca Weldon, was seen today.  she was seen for  Hypertenision and is well controlled on medication, Hyperlipidemia and is well controlled on medication, Hypothyroidism and is well controlled on Rx.  Labs are in desired treatment range and Vitamin D deficiency and well controlled on medication and labs at goal >30,  Dementia  BMP today

## 2019-08-01 LAB
BUN SERPL-MCNC: 16 MG/DL (ref 8–23)
BUN/CREAT SERPL: 16.7 (ref 7–25)
CALCIUM SERPL-MCNC: 9.2 MG/DL (ref 8.2–9.6)
CHLORIDE SERPL-SCNC: 102 MMOL/L (ref 98–107)
CO2 SERPL-SCNC: 29.2 MMOL/L (ref 22–29)
CREAT SERPL-MCNC: 0.96 MG/DL (ref 0.57–1)
GLUCOSE SERPL-MCNC: 99 MG/DL (ref 65–99)
POTASSIUM SERPL-SCNC: 3.8 MMOL/L (ref 3.5–5.2)
SODIUM SERPL-SCNC: 144 MMOL/L (ref 136–145)

## 2019-11-25 ENCOUNTER — OFFICE VISIT (OUTPATIENT)
Dept: RETAIL CLINIC | Facility: CLINIC | Age: 84
End: 2019-11-25

## 2019-11-25 VITALS
DIASTOLIC BLOOD PRESSURE: 76 MMHG | OXYGEN SATURATION: 96 % | HEART RATE: 74 BPM | TEMPERATURE: 97.4 F | SYSTOLIC BLOOD PRESSURE: 126 MMHG

## 2019-11-25 DIAGNOSIS — J40 BRONCHITIS: Primary | ICD-10-CM

## 2019-11-25 PROCEDURE — 99213 OFFICE O/P EST LOW 20 MIN: CPT | Performed by: NURSE PRACTITIONER

## 2019-11-25 RX ORDER — DOXYCYCLINE 100 MG/1
100 CAPSULE ORAL 2 TIMES DAILY
Qty: 10 CAPSULE | Refills: 0 | Status: SHIPPED | OUTPATIENT
Start: 2019-11-25 | End: 2019-12-03

## 2019-11-25 RX ORDER — GUAIFENESIN 600 MG/1
600 TABLET, EXTENDED RELEASE ORAL 2 TIMES DAILY
Qty: 28 TABLET | Refills: 0 | Status: SHIPPED | OUTPATIENT
Start: 2019-11-25 | End: 2019-12-09

## 2019-11-25 NOTE — PROGRESS NOTES
Subjective:     Blanca Weldon is a 89 y.o.     Cough   Episode onset: started a week ago. The cough is non-productive. Associated symptoms include a sore throat and shortness of breath (mild). Pertinent negatives include no ear pain, fever, nasal congestion or wheezing ( ). Treatments tried: cough syrup. The treatment provided mild relief.         The following portions of the patient's history were reviewed and updated as appropriate: allergies, current medications, past family history, past medical history, past social history, past surgical history and problem list.      Review of Systems   Constitutional: Negative for fever.   HENT: Positive for sore throat. Negative for ear pain.    Respiratory: Positive for cough and shortness of breath (mild). Negative for wheezing ( ).    Cardiovascular:        Hx:hypertension   Gastrointestinal: Nausea: resolved, last week lasted one day. Vomiting: resolved, lasted one day last week.         Objective:      Physical Exam   Constitutional: She appears well-developed and well-nourished.   HENT:   Head: Normocephalic and atraumatic.   Right Ear: Tympanic membrane and ear canal normal.   Left Ear: Ear canal normal.   Nose: Nose normal.   Mouth/Throat: No oropharyngeal exudate or posterior oropharyngeal erythema.   Some cerumen noted left auditory canal, ear wax removal drops discussed with patient and granddaughter (CG)   Cardiovascular: Normal rate, regular rhythm, S1 normal, S2 normal and normal heart sounds.   Pulmonary/Chest: Effort normal. She has rhonchi in the right upper field and the left upper field.   Occasional cough at visit   Abdominal: Soft. Normal appearance.   Lymphadenopathy:     She has no cervical adenopathy.   Vitals reviewed.          Diagnoses and all orders for this visit:    Bronchitis    Other orders  -     doxycycline (MONODOX) 100 MG capsule; Take 1 capsule by mouth 2 (Two) Times a Day.  -     guaiFENesin (MUCINEX) 600 MG 12 hr tablet; Take 1 tablet  by mouth 2 (Two) Times a Day for 14 days.    Hold calcium while on doxy. Lots of water. Exercise. CDB. May take Delsym at hs.

## 2019-11-29 ENCOUNTER — APPOINTMENT (OUTPATIENT)
Dept: GENERAL RADIOLOGY | Facility: HOSPITAL | Age: 84
End: 2019-11-29

## 2019-11-29 ENCOUNTER — HOSPITAL ENCOUNTER (EMERGENCY)
Facility: HOSPITAL | Age: 84
Discharge: HOME | End: 2019-11-29
Attending: EMERGENCY MEDICINE | Admitting: EMERGENCY MEDICINE

## 2019-11-29 VITALS
HEIGHT: 62 IN | RESPIRATION RATE: 18 BRPM | HEART RATE: 86 BPM | WEIGHT: 119.1 LBS | DIASTOLIC BLOOD PRESSURE: 73 MMHG | TEMPERATURE: 97.6 F | BODY MASS INDEX: 21.92 KG/M2 | OXYGEN SATURATION: 98 % | SYSTOLIC BLOOD PRESSURE: 126 MMHG

## 2019-11-29 DIAGNOSIS — S20.212A RIB CONTUSION, LEFT, INITIAL ENCOUNTER: Primary | ICD-10-CM

## 2019-11-29 PROCEDURE — 71101 X-RAY EXAM UNILAT RIBS/CHEST: CPT

## 2019-11-29 PROCEDURE — 99282 EMERGENCY DEPT VISIT SF MDM: CPT

## 2019-11-29 RX ORDER — HYDROCODONE BITARTRATE AND ACETAMINOPHEN 5; 325 MG/1; MG/1
1 TABLET ORAL EVERY 6 HOURS PRN
Qty: 15 TABLET | Refills: 0 | Status: SHIPPED | OUTPATIENT
Start: 2019-11-29 | End: 2019-12-03

## 2019-12-03 ENCOUNTER — TRANSCRIBE ORDERS (OUTPATIENT)
Dept: ADMINISTRATIVE | Facility: HOSPITAL | Age: 84
End: 2019-12-03

## 2019-12-03 ENCOUNTER — APPOINTMENT (OUTPATIENT)
Dept: LAB | Facility: HOSPITAL | Age: 84
End: 2019-12-03

## 2019-12-03 ENCOUNTER — OFFICE VISIT (OUTPATIENT)
Dept: FAMILY MEDICINE CLINIC | Facility: CLINIC | Age: 84
End: 2019-12-03

## 2019-12-03 ENCOUNTER — HOSPITAL ENCOUNTER (OUTPATIENT)
Dept: CT IMAGING | Facility: HOSPITAL | Age: 84
Discharge: HOME | End: 2019-12-03
Admitting: PHYSICIAN ASSISTANT

## 2019-12-03 VITALS
TEMPERATURE: 97.5 F | SYSTOLIC BLOOD PRESSURE: 110 MMHG | HEART RATE: 85 BPM | OXYGEN SATURATION: 96 % | BODY MASS INDEX: 22.08 KG/M2 | DIASTOLIC BLOOD PRESSURE: 62 MMHG | RESPIRATION RATE: 16 BRPM | HEIGHT: 62 IN | WEIGHT: 120 LBS

## 2019-12-03 DIAGNOSIS — R41.0 CONFUSION: Primary | ICD-10-CM

## 2019-12-03 DIAGNOSIS — R44.3 HALLUCINATIONS: ICD-10-CM

## 2019-12-03 LAB
ALBUMIN SERPL-MCNC: 4 G/DL (ref 3.5–5.2)
ALBUMIN/GLOB SERPL: 1.3 G/DL
ALP SERPL-CCNC: 79 U/L (ref 39–117)
ALT SERPL W P-5'-P-CCNC: 34 U/L (ref 1–33)
ANION GAP SERPL CALCULATED.3IONS-SCNC: 13.8 MMOL/L (ref 5–15)
AST SERPL-CCNC: 32 U/L (ref 1–32)
BACTERIA UR QL AUTO: NORMAL /HPF
BASOPHILS # BLD AUTO: 0.05 10*3/MM3 (ref 0–0.2)
BASOPHILS NFR BLD AUTO: 0.6 % (ref 0–1.5)
BILIRUB SERPL-MCNC: 0.3 MG/DL (ref 0.2–1.2)
BILIRUB UR QL STRIP: NEGATIVE
BUN BLD-MCNC: 13 MG/DL (ref 8–23)
BUN/CREAT SERPL: 16.3 (ref 7–25)
CALCIUM SPEC-SCNC: 9.6 MG/DL (ref 8.2–9.6)
CHLORIDE SERPL-SCNC: 98 MMOL/L (ref 98–107)
CLARITY UR: CLEAR
CO2 SERPL-SCNC: 24.2 MMOL/L (ref 22–29)
COLOR UR: YELLOW
CREAT BLD-MCNC: 0.8 MG/DL (ref 0.57–1)
D-LACTATE SERPL-SCNC: 1 MMOL/L (ref 0.5–2)
DEPRECATED RDW RBC AUTO: 46.9 FL (ref 37–54)
EOSINOPHIL # BLD AUTO: 0.09 10*3/MM3 (ref 0–0.4)
EOSINOPHIL NFR BLD AUTO: 1.1 % (ref 0.3–6.2)
ERYTHROCYTE [DISTWIDTH] IN BLOOD BY AUTOMATED COUNT: 12.8 % (ref 12.3–15.4)
GFR SERPL CREATININE-BSD FRML MDRD: 67 ML/MIN/1.73
GLOBULIN UR ELPH-MCNC: 3.1 GM/DL
GLUCOSE BLD-MCNC: 101 MG/DL (ref 65–99)
GLUCOSE UR STRIP-MCNC: NEGATIVE MG/DL
HCT VFR BLD AUTO: 37.3 % (ref 34–46.6)
HGB BLD-MCNC: 13.1 G/DL (ref 12–15.9)
HGB UR QL STRIP.AUTO: NEGATIVE
HYALINE CASTS UR QL AUTO: NORMAL /LPF
IMM GRANULOCYTES # BLD AUTO: 0.05 10*3/MM3 (ref 0–0.05)
IMM GRANULOCYTES NFR BLD AUTO: 0.6 % (ref 0–0.5)
KETONES UR QL STRIP: NEGATIVE
LEUKOCYTE ESTERASE UR QL STRIP.AUTO: NEGATIVE
LYMPHOCYTES # BLD AUTO: 2.77 10*3/MM3 (ref 0.7–3.1)
LYMPHOCYTES NFR BLD AUTO: 33.3 % (ref 19.6–45.3)
MCH RBC QN AUTO: 34.8 PG (ref 26.6–33)
MCHC RBC AUTO-ENTMCNC: 35.1 G/DL (ref 31.5–35.7)
MCV RBC AUTO: 99.2 FL (ref 79–97)
MONOCYTES # BLD AUTO: 0.85 10*3/MM3 (ref 0.1–0.9)
MONOCYTES NFR BLD AUTO: 10.2 % (ref 5–12)
NEUTROPHILS # BLD AUTO: 4.51 10*3/MM3 (ref 1.7–7)
NEUTROPHILS NFR BLD AUTO: 54.2 % (ref 42.7–76)
NITRITE UR QL STRIP: NEGATIVE
NRBC BLD AUTO-RTO: 0 /100 WBC (ref 0–0.2)
PH UR STRIP.AUTO: 5.5 [PH] (ref 5–8)
PLATELET # BLD AUTO: 301 10*3/MM3 (ref 140–450)
PMV BLD AUTO: 8.9 FL (ref 6–12)
POTASSIUM BLD-SCNC: 4.1 MMOL/L (ref 3.5–5.2)
PROCALCITONIN SERPL-MCNC: 0.13 NG/ML (ref 0.1–0.25)
PROT SERPL-MCNC: 7.1 G/DL (ref 6–8.5)
PROT UR QL STRIP: NEGATIVE
RBC # BLD AUTO: 3.76 10*6/MM3 (ref 3.77–5.28)
RBC # UR: NORMAL /HPF
REF LAB TEST METHOD: NORMAL
SODIUM BLD-SCNC: 136 MMOL/L (ref 136–145)
SP GR UR STRIP: 1.02 (ref 1–1.03)
SQUAMOUS #/AREA URNS HPF: NORMAL /HPF
UROBILINOGEN UR QL STRIP: NORMAL
WBC NRBC COR # BLD: 8.32 10*3/MM3 (ref 3.4–10.8)
WBC UR QL AUTO: NORMAL /HPF

## 2019-12-03 PROCEDURE — 87086 URINE CULTURE/COLONY COUNT: CPT | Performed by: PHYSICIAN ASSISTANT

## 2019-12-03 PROCEDURE — 99214 OFFICE O/P EST MOD 30 MIN: CPT | Performed by: PHYSICIAN ASSISTANT

## 2019-12-03 PROCEDURE — 70450 CT HEAD/BRAIN W/O DYE: CPT

## 2019-12-03 PROCEDURE — 84145 PROCALCITONIN (PCT): CPT | Performed by: PHYSICIAN ASSISTANT

## 2019-12-03 PROCEDURE — 81001 URINALYSIS AUTO W/SCOPE: CPT | Performed by: PHYSICIAN ASSISTANT

## 2019-12-03 PROCEDURE — 36415 COLL VENOUS BLD VENIPUNCTURE: CPT | Performed by: PHYSICIAN ASSISTANT

## 2019-12-03 PROCEDURE — 85025 COMPLETE CBC W/AUTO DIFF WBC: CPT | Performed by: PHYSICIAN ASSISTANT

## 2019-12-03 PROCEDURE — 80053 COMPREHEN METABOLIC PANEL: CPT | Performed by: PHYSICIAN ASSISTANT

## 2019-12-03 PROCEDURE — 83605 ASSAY OF LACTIC ACID: CPT | Performed by: PHYSICIAN ASSISTANT

## 2019-12-03 NOTE — NURSING NOTE
Dr. Amparo Josue called, ok'd for patient to go, patient taken to lab for ordered lab work prior to going home.

## 2019-12-03 NOTE — PROGRESS NOTES
"Subjective   Blanca Weldon is a 89 y.o. female.     History of Present Illness   Blanca Weldon 89 y.o. female /62 (BP Location: Left arm, Patient Position: Sitting, Cuff Size: Adult)   Pulse 85   Temp 97.5 °F (36.4 °C) (Oral)   Resp 16   Ht 157.5 cm (62\")   Wt 54.4 kg (120 lb)   LMP  (LMP Unknown)   SpO2 96%   BMI 21.95 kg/m²  who presents today for change in mentation.  Here with Cristin.  she has a history of   Patient Active Problem List   Diagnosis   • Essential hypertension   • Hyperlipidemia   • Osteopenia   • Hypothyroidism   • Dementia without behavioral disturbance (CMS/HCC)   .    Concern about infection with her acute change in mental status.  He is visual and auditory hallucinations for 7 days.  Sleeping and does get up early; eating ---but decreased appetite.  I am collecting a urine for check UTI  ER yesterday and xray done---no pneumonia on PA chest!!!!!!!  No labs or urine  Recent Rx URI and had Doxy; cough going away    Est CC 32  Fall yesterday; feel 1 week prior    The following portions of the patient's history were reviewed and updated as appropriate: allergies, current medications, past family history, past medical history, past social history, past surgical history and problem list.    Review of Systems   Constitutional: Negative for activity change, appetite change and unexpected weight change.   HENT: Negative for nosebleeds and trouble swallowing.    Eyes: Negative for pain and visual disturbance.   Respiratory: Negative for chest tightness, shortness of breath and wheezing.    Cardiovascular: Negative for chest pain and palpitations.   Gastrointestinal: Negative for abdominal pain and blood in stool.   Endocrine: Negative.    Genitourinary: Negative for difficulty urinating and hematuria.   Musculoskeletal: Negative for joint swelling.   Skin: Negative for color change and rash.   Allergic/Immunologic: Negative.    Neurological: Negative for syncope and speech difficulty. "   Hematological: Negative for adenopathy.   Psychiatric/Behavioral: Positive for behavioral problems, confusion and hallucinations. Negative for agitation and sleep disturbance.   All other systems reviewed and are negative.      Objective   Physical Exam   Constitutional: She is oriented to person, place, and time. She appears well-developed and well-nourished. No distress.   HENT:   Head: Normocephalic and atraumatic.   Mouth/Throat: No oropharyngeal exudate.   Ear fluid; mild pnd;    Eyes: Conjunctivae and EOM are normal. Pupils are equal, round, and reactive to light. Right eye exhibits no discharge. Left eye exhibits no discharge. No scleral icterus.   Neck: Normal range of motion. Neck supple. No tracheal deviation present. No thyromegaly present.   Cardiovascular: Normal rate, regular rhythm, normal heart sounds, intact distal pulses and normal pulses. Exam reveals no gallop.   No murmur heard.  Pulmonary/Chest: Effort normal and breath sounds normal. No respiratory distress. She has no wheezes. She has no rales.   Abdominal: Soft. Bowel sounds are normal. There is tenderness. There is no guarding.   Mild discomfort supra pubic area; no guard, +BS   Musculoskeletal: Normal range of motion.   Lymphadenopathy:     She has no cervical adenopathy.   Neurological: She is alert and oriented to person, place, and time. No sensory deficit. She exhibits normal muscle tone. Coordination normal.   Skin: Skin is warm. No rash noted. No erythema. No pallor.   Psychiatric: She has a normal mood and affect. Her behavior is normal.   She was friendly and alert and oriented    Nursing note and vitals reviewed.      Assessment/Plan   Blanca was seen today for hospital follow up and urinary tract infection.    Diagnoses and all orders for this visit:    Confusion  -     Cancel: POC Urinalysis Dipstick, Automated  -     Comprehensive Metabolic Panel  -     CBC & Differential  -     Procalcitonin  -     CT Head Without Contrast  -      Lactic Acid, Plasma  -     Urinalysis With Microscopic - Urine, Clean Catch  -     Urine Culture - Urine, Urine, Clean Catch  -     CBC Auto Differential  -     Urinalysis without microscopic (no culture) - Urine, Clean Catch  -     Urinalysis, Microscopic Only - Urine, Clean Catch    Hallucinations  -     Comprehensive Metabolic Panel  -     CBC & Differential  -     Procalcitonin  -     CT Head Without Contrast  -     Lactic Acid, Plasma  -     Urinalysis With Microscopic - Urine, Clean Catch  -     Urine Culture - Urine, Urine, Clean Catch  -     CBC Auto Differential  -     Urinalysis without microscopic (no culture) - Urine, Clean Catch  -     Urinalysis, Microscopic Only - Urine, Clean Catch        D/w DR Sanches  Needs work up for acute confusion and hallucinations for a week  Hx remote head trauma over a week ago and yesterday; make sure not stroke  Labs for sepsis  Still trying to get urine  Last est CC 32

## 2019-12-04 LAB — BACTERIA SPEC AEROBE CULT: NO GROWTH

## 2020-06-17 DIAGNOSIS — E03.9 HYPOTHYROIDISM, UNSPECIFIED TYPE: ICD-10-CM

## 2020-06-17 RX ORDER — LEVOTHYROXINE SODIUM 0.05 MG/1
TABLET ORAL
Qty: 90 TABLET | Refills: 0 | OUTPATIENT
Start: 2020-06-17

## 2020-10-18 RX ORDER — OMEPRAZOLE 20 MG/1
20 CAPSULE, DELAYED RELEASE ORAL DAILY
Qty: 90 CAPSULE | Refills: 0 | Status: SHIPPED | OUTPATIENT
Start: 2020-10-18 | End: 2021-01-14

## 2021-01-14 RX ORDER — OMEPRAZOLE 20 MG/1
20 CAPSULE, DELAYED RELEASE ORAL DAILY
Qty: 90 CAPSULE | Refills: 0 | Status: SHIPPED | OUTPATIENT
Start: 2021-01-14

## 2021-03-02 DIAGNOSIS — Z23 IMMUNIZATION DUE: ICD-10-CM
